# Patient Record
Sex: MALE | Race: WHITE | NOT HISPANIC OR LATINO | Employment: FULL TIME | URBAN - METROPOLITAN AREA
[De-identification: names, ages, dates, MRNs, and addresses within clinical notes are randomized per-mention and may not be internally consistent; named-entity substitution may affect disease eponyms.]

---

## 2023-04-24 LAB
ANION GAP IN SER/PLAS: 17 MMOL/L (ref 10–20)
CALCIUM (MG/DL) IN SER/PLAS: 10.3 MG/DL (ref 8.6–10.3)
CARBON DIOXIDE, TOTAL (MMOL/L) IN SER/PLAS: 25 MMOL/L (ref 21–32)
CHLORIDE (MMOL/L) IN SER/PLAS: 102 MMOL/L (ref 98–107)
CREATININE (MG/DL) IN SER/PLAS: 1.09 MG/DL (ref 0.5–1.3)
ESTIMATED AVERAGE GLUCOSE FOR HBA1C: 137 MG/DL
GFR MALE: 75 ML/MIN/1.73M2
GLUCOSE (MG/DL) IN SER/PLAS: 68 MG/DL (ref 74–99)
HEMOGLOBIN A1C/HEMOGLOBIN TOTAL IN BLOOD: 6.4 %
POTASSIUM (MMOL/L) IN SER/PLAS: 4 MMOL/L (ref 3.5–5.3)
PROSTATE SPECIFIC AG (NG/ML) IN SER/PLAS: 1.11 NG/ML (ref 0–4)
SODIUM (MMOL/L) IN SER/PLAS: 140 MMOL/L (ref 136–145)
UREA NITROGEN (MG/DL) IN SER/PLAS: 21 MG/DL (ref 6–23)

## 2023-05-02 ENCOUNTER — HOSPITAL ENCOUNTER (OUTPATIENT)
Dept: DATA CONVERSION | Facility: HOSPITAL | Age: 63
End: 2023-05-02
Attending: INTERNAL MEDICINE | Admitting: INTERNAL MEDICINE
Payer: COMMERCIAL

## 2023-05-02 DIAGNOSIS — M53.1 CERVICOBRACHIAL SYNDROME: ICD-10-CM

## 2023-05-02 DIAGNOSIS — Z95.1 PRESENCE OF AORTOCORONARY BYPASS GRAFT: ICD-10-CM

## 2023-05-02 DIAGNOSIS — Z12.11 ENCOUNTER FOR SCREENING FOR MALIGNANT NEOPLASM OF COLON: ICD-10-CM

## 2023-05-02 DIAGNOSIS — G47.33 OBSTRUCTIVE SLEEP APNEA (ADULT) (PEDIATRIC): ICD-10-CM

## 2023-05-02 DIAGNOSIS — I49.01 VENTRICULAR FIBRILLATION (MULTI): ICD-10-CM

## 2023-05-02 DIAGNOSIS — E78.5 HYPERLIPIDEMIA, UNSPECIFIED: ICD-10-CM

## 2023-05-02 DIAGNOSIS — Z95.5 PRESENCE OF CORONARY ANGIOPLASTY IMPLANT AND GRAFT: ICD-10-CM

## 2023-05-02 DIAGNOSIS — K57.30 DIVERTICULOSIS OF LARGE INTESTINE WITHOUT PERFORATION OR ABSCESS WITHOUT BLEEDING: ICD-10-CM

## 2023-05-02 DIAGNOSIS — I25.2 OLD MYOCARDIAL INFARCTION: ICD-10-CM

## 2023-05-02 DIAGNOSIS — G56.03 CARPAL TUNNEL SYNDROME, BILATERAL UPPER LIMBS: ICD-10-CM

## 2023-05-02 DIAGNOSIS — D12.2 BENIGN NEOPLASM OF ASCENDING COLON: ICD-10-CM

## 2023-05-02 DIAGNOSIS — R73.03 PREDIABETES: ICD-10-CM

## 2023-05-02 DIAGNOSIS — Z87.891 PERSONAL HISTORY OF NICOTINE DEPENDENCE: ICD-10-CM

## 2023-05-02 DIAGNOSIS — I25.10 ATHEROSCLEROTIC HEART DISEASE OF NATIVE CORONARY ARTERY WITHOUT ANGINA PECTORIS: ICD-10-CM

## 2023-05-02 DIAGNOSIS — Z79.84 LONG TERM (CURRENT) USE OF ORAL HYPOGLYCEMIC DRUGS: ICD-10-CM

## 2023-05-02 DIAGNOSIS — Z86.010 PERSONAL HISTORY OF COLONIC POLYPS: ICD-10-CM

## 2023-05-02 DIAGNOSIS — I10 ESSENTIAL (PRIMARY) HYPERTENSION: ICD-10-CM

## 2023-05-02 DIAGNOSIS — Z79.82 LONG TERM (CURRENT) USE OF ASPIRIN: ICD-10-CM

## 2023-05-02 LAB — POCT GLUCOSE: 109 MG/DL (ref 74–99)

## 2023-05-08 LAB
COMPLETE PATHOLOGY REPORT: NORMAL
CONVERTED CLINICAL DIAGNOSIS-HISTORY: NORMAL
CONVERTED FINAL DIAGNOSIS: NORMAL
CONVERTED FINAL REPORT PDF LINK TO COPY AND PASTE: NORMAL
CONVERTED GROSS DESCRIPTION: NORMAL

## 2023-05-12 ENCOUNTER — OFFICE VISIT (OUTPATIENT)
Dept: PRIMARY CARE | Facility: CLINIC | Age: 63
End: 2023-05-12
Payer: COMMERCIAL

## 2023-05-12 VITALS
HEART RATE: 60 BPM | RESPIRATION RATE: 16 BRPM | SYSTOLIC BLOOD PRESSURE: 127 MMHG | WEIGHT: 233.6 LBS | TEMPERATURE: 97.3 F | DIASTOLIC BLOOD PRESSURE: 73 MMHG | BODY MASS INDEX: 33.52 KG/M2 | OXYGEN SATURATION: 98 %

## 2023-05-12 DIAGNOSIS — G47.33 OBSTRUCTIVE SLEEP APNEA ON CPAP: Primary | ICD-10-CM

## 2023-05-12 DIAGNOSIS — R93.1 AGATSTON CORONARY ARTERY CALCIUM SCORE GREATER THAN 400: ICD-10-CM

## 2023-05-12 DIAGNOSIS — E78.5 DYSLIPIDEMIA, GOAL LDL BELOW 70: ICD-10-CM

## 2023-05-12 DIAGNOSIS — K76.0 FATTY LIVER DISEASE, NONALCOHOLIC: ICD-10-CM

## 2023-05-12 DIAGNOSIS — I25.10 CORONARY ARTERY DISEASE INVOLVING NATIVE CORONARY ARTERY OF NATIVE HEART WITHOUT ANGINA PECTORIS: ICD-10-CM

## 2023-05-12 DIAGNOSIS — E66.9 CLASS 1 OBESITY WITH SERIOUS COMORBIDITY AND BODY MASS INDEX (BMI) OF 33.0 TO 33.9 IN ADULT, UNSPECIFIED OBESITY TYPE: ICD-10-CM

## 2023-05-12 DIAGNOSIS — E11.9 TYPE 2 DIABETES MELLITUS WITHOUT COMPLICATION, WITHOUT LONG-TERM CURRENT USE OF INSULIN (MULTI): ICD-10-CM

## 2023-05-12 DIAGNOSIS — I10 ESSENTIAL HYPERTENSION WITH GOAL BLOOD PRESSURE LESS THAN 130/85: ICD-10-CM

## 2023-05-12 DIAGNOSIS — I46.9 CARDIAC ARREST WITH VENTRICULAR FIBRILLATION (MULTI): ICD-10-CM

## 2023-05-12 DIAGNOSIS — D12.6 TUBULAR ADENOMA OF COLON: ICD-10-CM

## 2023-05-12 DIAGNOSIS — I49.01 CARDIAC ARREST WITH VENTRICULAR FIBRILLATION (MULTI): ICD-10-CM

## 2023-05-12 PROBLEM — R73.03 PREDIABETES: Status: ACTIVE | Noted: 2023-05-12

## 2023-05-12 PROBLEM — E66.01 SEVERE OBESITY WITH BODY MASS INDEX (BMI) OF 35.0 TO 39.9 WITH SERIOUS COMORBIDITY (MULTI): Status: RESOLVED | Noted: 2023-05-12 | Resolved: 2023-05-12

## 2023-05-12 PROBLEM — Z95.1 S/P CABG (CORONARY ARTERY BYPASS GRAFT): Status: ACTIVE | Noted: 2023-05-12

## 2023-05-12 PROBLEM — K42.9 UMBILICAL HERNIA: Status: ACTIVE | Noted: 2023-05-12

## 2023-05-12 PROBLEM — Z97.3 WEARS GLASSES: Status: ACTIVE | Noted: 2023-05-12

## 2023-05-12 PROBLEM — R91.8 LUNG NODULES: Status: ACTIVE | Noted: 2023-05-12

## 2023-05-12 PROBLEM — K21.9 CHRONIC GERD: Status: ACTIVE | Noted: 2023-05-12

## 2023-05-12 PROBLEM — E55.9 VITAMIN D DEFICIENCY: Status: ACTIVE | Noted: 2023-05-12

## 2023-05-12 PROBLEM — S39.012A LUMBAR STRAIN: Status: ACTIVE | Noted: 2023-05-12

## 2023-05-12 PROBLEM — K40.20 BILATERAL INGUINAL HERNIA: Status: ACTIVE | Noted: 2023-05-12

## 2023-05-12 PROBLEM — I22.2 SUBSEQUENT NON-ST ELEVATION (NSTEMI) MYOCARDIAL INFARCTION (MULTI): Status: ACTIVE | Noted: 2023-05-12

## 2023-05-12 PROBLEM — M77.12 LATERAL EPICONDYLITIS OF LEFT ELBOW: Status: ACTIVE | Noted: 2023-05-12

## 2023-05-12 PROBLEM — E66.01 SEVERE OBESITY WITH BODY MASS INDEX (BMI) OF 35.0 TO 39.9 WITH SERIOUS COMORBIDITY (MULTI): Status: ACTIVE | Noted: 2023-05-12

## 2023-05-12 PROBLEM — L91.8 SKIN TAG: Status: ACTIVE | Noted: 2023-05-12

## 2023-05-12 PROBLEM — L63.9 ALOPECIA AREATA: Status: ACTIVE | Noted: 2023-05-12

## 2023-05-12 PROBLEM — Z97.2 WEARS DENTURES: Status: ACTIVE | Noted: 2023-05-12

## 2023-05-12 PROBLEM — M54.12 CERVICAL RADICULOPATHY: Status: ACTIVE | Noted: 2023-05-12

## 2023-05-12 PROBLEM — G56.03 BILATERAL CARPAL TUNNEL SYNDROME: Status: ACTIVE | Noted: 2023-05-12

## 2023-05-12 PROBLEM — M17.11 RIGHT KNEE DJD: Status: ACTIVE | Noted: 2023-05-12

## 2023-05-12 PROBLEM — M25.361 OTHER INSTABILITY, RIGHT KNEE: Status: ACTIVE | Noted: 2023-05-12

## 2023-05-12 PROBLEM — M17.9 DJD (DEGENERATIVE JOINT DISEASE) OF KNEE: Status: ACTIVE | Noted: 2023-05-12

## 2023-05-12 PROBLEM — J32.9 SINUSITIS: Status: ACTIVE | Noted: 2023-05-12

## 2023-05-12 PROBLEM — R20.2 PARESTHESIA OF BOTH HANDS: Status: ACTIVE | Noted: 2023-05-12

## 2023-05-12 PROBLEM — N40.0 BENIGN ENLARGEMENT OF PROSTATE: Status: ACTIVE | Noted: 2023-05-12

## 2023-05-12 PROCEDURE — 3078F DIAST BP <80 MM HG: CPT | Performed by: INTERNAL MEDICINE

## 2023-05-12 PROCEDURE — 4010F ACE/ARB THERAPY RXD/TAKEN: CPT | Performed by: INTERNAL MEDICINE

## 2023-05-12 PROCEDURE — 3044F HG A1C LEVEL LT 7.0%: CPT | Performed by: INTERNAL MEDICINE

## 2023-05-12 PROCEDURE — 1036F TOBACCO NON-USER: CPT | Performed by: INTERNAL MEDICINE

## 2023-05-12 PROCEDURE — 3074F SYST BP LT 130 MM HG: CPT | Performed by: INTERNAL MEDICINE

## 2023-05-12 PROCEDURE — 99214 OFFICE O/P EST MOD 30 MIN: CPT | Performed by: INTERNAL MEDICINE

## 2023-05-12 PROCEDURE — 3008F BODY MASS INDEX DOCD: CPT | Performed by: INTERNAL MEDICINE

## 2023-05-12 RX ORDER — MELOXICAM 7.5 MG/1
1 TABLET ORAL DAILY
COMMUNITY
Start: 2020-09-05 | End: 2024-02-29 | Stop reason: ALTCHOICE

## 2023-05-12 RX ORDER — ATORVASTATIN CALCIUM 80 MG/1
10 TABLET, FILM COATED ORAL DAILY
COMMUNITY
End: 2023-10-16 | Stop reason: SDUPTHER

## 2023-05-12 RX ORDER — GLIMEPIRIDE 2 MG/1
TABLET ORAL
COMMUNITY
End: 2023-05-17 | Stop reason: SDUPTHER

## 2023-05-12 RX ORDER — SOD SULF/POT CHLORIDE/MAG SULF 1.479 G
TABLET ORAL
COMMUNITY
Start: 2023-03-06 | End: 2024-02-29 | Stop reason: ALTCHOICE

## 2023-05-12 RX ORDER — METOPROLOL TARTRATE 25 MG/1
12.5 TABLET, FILM COATED ORAL 2 TIMES DAILY
COMMUNITY
End: 2024-03-08

## 2023-05-12 RX ORDER — NITROGLYCERIN 400 UG/1
SPRAY ORAL
COMMUNITY
Start: 2019-09-18 | End: 2023-05-12 | Stop reason: SDUPTHER

## 2023-05-12 RX ORDER — CALCIUM CITRATE/VITAMIN D3 200MG-6.25
TABLET ORAL
COMMUNITY
Start: 2017-08-24 | End: 2023-10-16 | Stop reason: SDUPTHER

## 2023-05-12 RX ORDER — NITROGLYCERIN 400 UG/1
2 SPRAY ORAL EVERY 5 MIN PRN
Qty: 12 G | Refills: 11 | Status: SHIPPED | OUTPATIENT
Start: 2023-05-12 | End: 2023-10-09 | Stop reason: HOSPADM

## 2023-05-12 RX ORDER — AMOXICILLIN AND CLAVULANATE POTASSIUM 875; 125 MG/1; MG/1
TABLET, FILM COATED ORAL
COMMUNITY
Start: 2022-08-22 | End: 2023-05-12 | Stop reason: ALTCHOICE

## 2023-05-12 RX ORDER — ACETAMINOPHEN 500 MG/1
CAPSULE, LIQUID FILLED ORAL
COMMUNITY

## 2023-05-12 RX ORDER — IBUPROFEN 200 MG
TABLET ORAL
COMMUNITY

## 2023-05-12 RX ORDER — LISINOPRIL 40 MG/1
40 TABLET ORAL DAILY
COMMUNITY
End: 2023-10-16 | Stop reason: SDUPTHER

## 2023-05-12 RX ORDER — FLUTICASONE PROPIONATE 50 MCG
SPRAY, SUSPENSION (ML) NASAL
COMMUNITY
Start: 2022-08-22 | End: 2024-02-29 | Stop reason: ALTCHOICE

## 2023-05-12 RX ORDER — ACETAMINOPHEN 500 MG
TABLET ORAL
COMMUNITY
Start: 2020-11-19

## 2023-05-12 RX ORDER — METFORMIN HYDROCHLORIDE 1000 MG/1
1000 TABLET ORAL
COMMUNITY
End: 2023-10-16 | Stop reason: SDUPTHER

## 2023-05-12 RX ORDER — METFORMIN HYDROCHLORIDE 500 MG/1
1000 TABLET ORAL 2 TIMES DAILY
COMMUNITY
End: 2023-05-12 | Stop reason: ALTCHOICE

## 2023-05-12 ASSESSMENT — ENCOUNTER SYMPTOMS
LOSS OF SENSATION IN FEET: 0
DEPRESSION: 0
OCCASIONAL FEELINGS OF UNSTEADINESS: 0

## 2023-05-12 ASSESSMENT — PATIENT HEALTH QUESTIONNAIRE - PHQ9
SUM OF ALL RESPONSES TO PHQ9 QUESTIONS 1 AND 2: 0
2. FEELING DOWN, DEPRESSED OR HOPELESS: NOT AT ALL
1. LITTLE INTEREST OR PLEASURE IN DOING THINGS: NOT AT ALL

## 2023-05-17 DIAGNOSIS — R73.03 PREDIABETES: Primary | ICD-10-CM

## 2023-05-17 RX ORDER — GLIMEPIRIDE 2 MG/1
TABLET ORAL
Qty: 90 TABLET | Refills: 3 | Status: SHIPPED | OUTPATIENT
Start: 2023-05-17 | End: 2024-04-13

## 2023-05-25 ENCOUNTER — TELEMEDICINE (OUTPATIENT)
Dept: PHARMACY | Facility: HOSPITAL | Age: 63
End: 2023-05-25
Payer: COMMERCIAL

## 2023-05-25 DIAGNOSIS — E11.9 TYPE 2 DIABETES MELLITUS WITHOUT COMPLICATION, WITHOUT LONG-TERM CURRENT USE OF INSULIN (MULTI): ICD-10-CM

## 2023-06-01 ENCOUNTER — TELEMEDICINE (OUTPATIENT)
Dept: PHARMACY | Facility: HOSPITAL | Age: 63
End: 2023-06-01
Payer: COMMERCIAL

## 2023-06-01 DIAGNOSIS — E11.9 TYPE 2 DIABETES MELLITUS WITHOUT COMPLICATION, WITHOUT LONG-TERM CURRENT USE OF INSULIN (MULTI): Primary | ICD-10-CM

## 2023-06-01 NOTE — PROGRESS NOTES
Subjective   Patient ID: Sal Kruger is a 62 y.o. male who presents for medication management for Prediabetes    Referring Provider: Lake Sage MD     Patient referred for medication assistance/coverage for once-weekly GLP1RA therapy.     Objective     There were no vitals taken for this visit.     Diabetes Pharmacotherapy:   - Metformin 1000 mg: Take one tablet by mouth twice daily  - Glimepiride 2 mg: Take one tablet by mouth once daily    Secondary Prevention:   - Aspirin 81 mg: Yes  - Statin: Yes (Atorvastatin 80 mg)  - ACE-I/ARB: Yes (Lisinopril 40 mg)    LAB  Lab Results   Component Value Date    BILITOT 0.7 12/08/2019    CALCIUM 10.3 04/24/2023    CO2 25 04/24/2023     04/24/2023    CREATININE 1.09 04/24/2023    GLUCOSE 68 (L) 04/24/2023    ALKPHOS 51 12/08/2019    K 4.0 04/24/2023    PROT 6.6 12/08/2019     04/24/2023    AST 20 12/08/2019    ALT 38 10/08/2022    BUN 21 04/24/2023    ANIONGAP 17 04/24/2023    MG 2.20 12/09/2019    PHOS 4.3 12/09/2019    ALBUMIN 3.6 12/09/2019    GFRMALE 75 04/24/2023     Lab Results   Component Value Date    TRIG 193 (H) 10/08/2022    CHOL 112 10/08/2022    HDL 38.0 (A) 10/08/2022     Component      Latest Ref Rng 10/8/2022   LDL      0 - 99 mg/dL 35      Lab Results   Component Value Date    HGBA1C 6.4 (A) 04/24/2023    HGBA1C 5.9 (A) 10/08/2022    HGBA1C 6.0 (A) 05/11/2022     Lab Results   Component Value Date    CREATININE 1.09 04/24/2023        The ASCVD Risk score (Marci ANGEL, et al., 2019) failed to calculate for the following reasons:    The patient has a prior MI or stroke diagnosis    Assessment/Plan   Problem List Items Addressed This Visit          Endocrine/Metabolic    Type 2 diabetes mellitus without complication, without long-term current use of insulin (CMS/[facility]) - Primary     Prior authorization has been submitted on behalf of this patient for Trulicity 0.75 mg on 06/01/23 . Awaiting determination for status of PA request.    Patient  will be contacted upon response of patient's prior authorization.     Advised patient to continue with Diabetes pharmacotherapy as prescribed at this time. I will follow up when the approval/denial has been provided via [employer].     Tres Adair, PharmD  Clinical Pharmacist  [phone]    Continue all meds under the continuation of care with the referring provider and clinical pharmacy team

## 2023-06-12 ENCOUNTER — TELEPHONE (OUTPATIENT)
Dept: PRIMARY CARE | Facility: CLINIC | Age: 63
End: 2023-06-12
Payer: COMMERCIAL

## 2023-06-12 NOTE — TELEPHONE ENCOUNTER
Sal Kruger has been denied for prior authorization for Trulicity 0.75 mg . Patient has been contacted regarding the status of their prior authorization.     Reason for Denial: Current hemoglobin A1c is <7.0% as of 4/24/2023 and patient is not on/has not tried a brand name oral medication (I.e. Januvia, Tradjenta, Farxiga, Jardiance).     Alternative therapy for Januvia 100 mg instead of Glimepiride 2 mg could be considered to reduce potential risk of hypoglycemia and reduce associated weight gain with sulfonylurea therapy. Based on BMP from 10/8/2022 current renal function would be appropriate for Januvia 100 mg.     Tres Adair, PharmD  Clinical Pharmacist  [phone]

## 2023-09-07 VITALS — BODY MASS INDEX: 32.98 KG/M2 | WEIGHT: 230.38 LBS | HEIGHT: 70 IN

## 2023-09-21 ENCOUNTER — LAB (OUTPATIENT)
Dept: LAB | Facility: LAB | Age: 63
End: 2023-09-21
Payer: COMMERCIAL

## 2023-09-21 DIAGNOSIS — E11.9 TYPE 2 DIABETES MELLITUS WITHOUT COMPLICATION, WITHOUT LONG-TERM CURRENT USE OF INSULIN (MULTI): ICD-10-CM

## 2023-09-21 DIAGNOSIS — K76.0 FATTY LIVER DISEASE, NONALCOHOLIC: ICD-10-CM

## 2023-09-21 DIAGNOSIS — E78.5 DYSLIPIDEMIA, GOAL LDL BELOW 70: ICD-10-CM

## 2023-09-21 LAB
ALANINE AMINOTRANSFERASE (SGPT) (U/L) IN SER/PLAS: 34 U/L (ref 10–52)
ALBUMIN (G/DL) IN SER/PLAS: 4.3 G/DL (ref 3.4–5)
ALKALINE PHOSPHATASE (U/L) IN SER/PLAS: 51 U/L (ref 33–136)
ANION GAP IN SER/PLAS: 13 MMOL/L (ref 10–20)
ASPARTATE AMINOTRANSFERASE (SGOT) (U/L) IN SER/PLAS: 29 U/L (ref 9–39)
BILIRUBIN DIRECT (MG/DL) IN SER/PLAS: 0.1 MG/DL (ref 0–0.3)
BILIRUBIN TOTAL (MG/DL) IN SER/PLAS: 0.5 MG/DL (ref 0–1.2)
CALCIUM (MG/DL) IN SER/PLAS: 9.6 MG/DL (ref 8.6–10.3)
CARBON DIOXIDE, TOTAL (MMOL/L) IN SER/PLAS: 28 MMOL/L (ref 21–32)
CHLORIDE (MMOL/L) IN SER/PLAS: 106 MMOL/L (ref 98–107)
CHOLESTEROL (MG/DL) IN SER/PLAS: 112 MG/DL (ref 0–199)
CHOLESTEROL IN HDL (MG/DL) IN SER/PLAS: 38.3 MG/DL
CHOLESTEROL/HDL RATIO: 2.9
CREATININE (MG/DL) IN SER/PLAS: 0.96 MG/DL (ref 0.5–1.3)
ESTIMATED AVERAGE GLUCOSE FOR HBA1C: 134 MG/DL
GFR MALE: 88 ML/MIN/1.73M2
GLUCOSE (MG/DL) IN SER/PLAS: 83 MG/DL (ref 74–99)
HEMOGLOBIN A1C/HEMOGLOBIN TOTAL IN BLOOD: 6.3 %
LDL: 38 MG/DL (ref 0–99)
POTASSIUM (MMOL/L) IN SER/PLAS: 4.4 MMOL/L (ref 3.5–5.3)
PROTEIN TOTAL: 7.2 G/DL (ref 6.4–8.2)
SODIUM (MMOL/L) IN SER/PLAS: 143 MMOL/L (ref 136–145)
TRIGLYCERIDE (MG/DL) IN SER/PLAS: 179 MG/DL (ref 0–149)
UREA NITROGEN (MG/DL) IN SER/PLAS: 19 MG/DL (ref 6–23)
VLDL: 36 MG/DL (ref 0–40)

## 2023-09-21 PROCEDURE — 80061 LIPID PANEL: CPT

## 2023-09-21 PROCEDURE — 36415 COLL VENOUS BLD VENIPUNCTURE: CPT

## 2023-09-21 PROCEDURE — 80076 HEPATIC FUNCTION PANEL: CPT

## 2023-09-21 PROCEDURE — 83036 HEMOGLOBIN GLYCOSYLATED A1C: CPT

## 2023-09-21 PROCEDURE — 80048 BASIC METABOLIC PNL TOTAL CA: CPT

## 2023-10-04 ENCOUNTER — HOSPITAL ENCOUNTER (OUTPATIENT)
Dept: RADIOLOGY | Facility: CLINIC | Age: 63
End: 2023-10-04
Payer: COMMERCIAL

## 2023-10-05 ENCOUNTER — OFFICE VISIT (OUTPATIENT)
Dept: ORTHOPEDIC SURGERY | Facility: CLINIC | Age: 63
End: 2023-10-05
Payer: COMMERCIAL

## 2023-10-05 DIAGNOSIS — M17.11 PRIMARY OSTEOARTHRITIS OF RIGHT KNEE: Primary | ICD-10-CM

## 2023-10-05 PROCEDURE — 1126F AMNT PAIN NOTED NONE PRSNT: CPT | Performed by: FAMILY MEDICINE

## 2023-10-05 PROCEDURE — 1159F MED LIST DOCD IN RCRD: CPT | Performed by: FAMILY MEDICINE

## 2023-10-05 PROCEDURE — 4010F ACE/ARB THERAPY RXD/TAKEN: CPT | Performed by: FAMILY MEDICINE

## 2023-10-05 PROCEDURE — 1036F TOBACCO NON-USER: CPT | Performed by: FAMILY MEDICINE

## 2023-10-05 PROCEDURE — 3008F BODY MASS INDEX DOCD: CPT | Performed by: FAMILY MEDICINE

## 2023-10-05 PROCEDURE — 20611 DRAIN/INJ JOINT/BURSA W/US: CPT | Performed by: FAMILY MEDICINE

## 2023-10-05 PROCEDURE — 99214 OFFICE O/P EST MOD 30 MIN: CPT | Performed by: FAMILY MEDICINE

## 2023-10-05 PROCEDURE — 1160F RVW MEDS BY RX/DR IN RCRD: CPT | Performed by: FAMILY MEDICINE

## 2023-10-05 PROCEDURE — 3044F HG A1C LEVEL LT 7.0%: CPT | Performed by: FAMILY MEDICINE

## 2023-10-05 NOTE — LETTER
October 9, 2023     Lake Sage MD  [address]  [facility]  [facility] [address]    Patient: Sal Kruger   YOB: 1960   Date of Visit: 10/5/2023       Dear Dr. Lake Sage MD:    Thank you for referring Sal Kruger to me for evaluation. Below are my notes for this consultation.  If you have questions, please do not hesitate to call me. I look forward to following your patient along with you.       Sincerely,     Jaime Rice MD      CC: No Recipients  ______________________________________________________________________________________    Sports Medicine Office Note    Today's Date: 10/9/2023     HPI: Sal Kruger is a 63 y.o.  who presents today for follow-up of chronic right knee pain with DJD.    He has received multiple, serial cortisone injections for his chronic knee DJD. It gives him great relief for up 12 months the last several times. His most recent injection on 02/2020 gave him great relief for over 1 year. His pain has gradually returned over the past 2-3 weeks. He denies injury or trauma. Today, he has dull deep right knee pain and denies injury or trauma. He takes occasional ibuprofen which has been less effective recently. He denies other treatments. He continues to work with his brace. He has no new complaints. He is requesting repeat cortisone injection for analgesia. We agreed upon repeating the cortisone injection into his right knee which he tolerated well. He will take prescription acetaminophen. He can continue working in the knee brace. Activity modifications were reviewed. He understands that he will ultimately need knee replacement but we will try to hold this off as long as possible.      Of note, he had an MI in May 2021 and a second one in June 2021 that required cardiac resuscitation. This led to 6 stents that eventually clotted off and he had triple bypass in December at [facility]. He has recovered very well.     On  1/12/22 Sal returns today requesting repeat CSI for pain control at his right knee. He has done very well with serial corticosteroid injections for his chronic right knee DJD. The last CSI made him feel 80% better over all. Over the past 2-3 weeks his pain began to return, worse with walking, stairs, bending and does wake him at night on occasion. No new injury.   We agreed upon repeating the cortisone injection into his right knee which he tolerated well. He will take prescription acetaminophen. He can continue with his knee brace. Activity modifications were reviewed. He understands that he will ultimately need knee replacement but we will try to hold this off as long as possible. He will followup in 3-6 months or sooner for recheck.     On 9/8/2022, he returns for an 8-month follow-up of chronic right knee pain with DJD. He is requesting repeat cortisone injection for long-term analgesia to help with pain and swelling secondary to knee arthritis. He reports a previous injection given on 1/12/2022 gave him 6 to 7 months of great relief. It started to wear off and his pain has increased over the past 1 month. He denies interval injury or trauma. We agreed upon repeating the cortisone injection into his right knee which he tolerated well. He will take prescription acetaminophen. He can continue with his knee brace. Activity modifications were reviewed. He understands that he will ultimately need knee replacement but we will try to hold this off as long as possible. He will followup in 3-6 months or sooner for recheck.     On 9/26/2022, he returns for 2-week follow-up of chronic right knee pain with DJD. He has received good benefit from the cortisone injection at his last visit. He is wanting to replace a orthopedic knee brace he has been wearing for several years. Upon reviewing the chart it appears to be from 2015 when I gave it to him. It looks like an [employer] hinge brace. It is falling apart and he no longer  is getting good fit. He would like a new one. He denies interval injury or trauma since his last visit.   We agreed upon replacing his previous brace with a custom made DJO climaflex OA lateral  which should give him better relief than his current off-the-shelf hinged brace. I will see him back when his pain returns and his cortisone shot wears off.     On 3/16/2023, he returns for 6-month follow-up of chronic right knee DJD. He reports previous injection given on 9/8/2022 lasted 5 months and then started to wear off. He is very happy with his  knee brace and wears it often. He denies interval injury or trauma. He is requesting long-term analgesia.   We agreed upon repeating the cortisone injection into his right knee which he tolerated well. He will take prescription acetaminophen. He can continue with his knee brace. Activity modifications were reviewed. He understands that he will ultimately need knee replacement but we will try to hold this off as long as possible. He will followup in 3-6 months or sooner for recheck.     Today, he returns for 7-month follow-up of chronic right knee pain with DJD.  He is requesting repeat cortisone injection for long-term analgesia.  The previous injection given on 3/16/2023 recently started to wear off over the past couple of weeks.  He denies interval injury or trauma.    He has no other complaints.     Physical Examination:     The right knee is without obvious signs of acute bony deformity, swelling, erythema, or ecchymosis. There is trace to grade 1 effusion. The patella is without tenderness. Apprehension is negative with medial and lateral glide. Patella crepitus and patella grind are positive. The medial joint line is mildly tender and without bony crepitus or step-off. The lateral joint line is mildly tender and without bony crepitus or step-off. Flexion & extension are full and symmetrical. There is no instability with stress testing. The opposite knee  is nontender and stable. Gait is pain-free and tandem.       Procedure Note:  After consent was obtained, the right knee was prepped in a sterile fashion. Ultrasound guidance was used to help insure proper needle placement into the joint, decrease patient discomfort, and decrease collateral damage. The knee joint was visualized and Depo-Medrol 80 mg with lidocaine 4 mL & ropivacaine 4 mL were injected without any complications. Ultrasound images were saved on an internal file for later reference. The patient tolerated the procedure well and the area was cleaned and bandaged.      Assessment and Plan:     We reviewed the exam and x-ray findings and discussed the conservative and surgical treatment options. We agreed We agreed upon repeating the cortisone injection into his right knee which he tolerated well. He will take prescription acetaminophen. He can continue with his knee brace. Activity modifications were reviewed. He understands that he will ultimately need knee replacement but we will try to hold this off as long as possible. He will followup in 3-6 months or sooner for recheck.     **This note was dictated using Dragon speech recognition software and was not corrected for spelling or grammatical errors**.    Jaime Rice MD  Sports Medicine Specialist  [facility]    Right knee injection    Patient ID: Sal Kruger is a 63 y.o. male.    L Inj/Asp on 10/5/2023 5:17 PM  Indications: pain  Details: 22 G needle, ultrasound-guided superolateral approach

## 2023-10-05 NOTE — PROGRESS NOTES
Right knee injection    Patient ID: Sal Kruger is a 63 y.o. male.    L Inj/Asp on 10/5/2023 5:17 PM  Indications: pain  Details: 22 G needle, ultrasound-guided superolateral approach

## 2023-10-09 ENCOUNTER — HOSPITAL ENCOUNTER (OUTPATIENT)
Dept: GASTROENTEROLOGY | Facility: EXTERNAL LOCATION | Age: 63
Discharge: HOME | End: 2023-10-09
Payer: COMMERCIAL

## 2023-10-09 ENCOUNTER — ANESTHESIA (OUTPATIENT)
Dept: GASTROENTEROLOGY | Facility: EXTERNAL LOCATION | Age: 63
End: 2023-10-09

## 2023-10-09 ENCOUNTER — ANESTHESIA EVENT (OUTPATIENT)
Dept: GASTROENTEROLOGY | Facility: EXTERNAL LOCATION | Age: 63
End: 2023-10-09

## 2023-10-09 VITALS
OXYGEN SATURATION: 99 % | RESPIRATION RATE: 14 BRPM | SYSTOLIC BLOOD PRESSURE: 136 MMHG | HEART RATE: 71 BPM | DIASTOLIC BLOOD PRESSURE: 80 MMHG | BODY MASS INDEX: 32.93 KG/M2 | HEIGHT: 70 IN | WEIGHT: 230 LBS | TEMPERATURE: 97.9 F

## 2023-10-09 DIAGNOSIS — Z12.11 ENCOUNTER FOR SCREENING FOR MALIGNANT NEOPLASM OF COLON: Primary | ICD-10-CM

## 2023-10-09 PROCEDURE — 45385 COLONOSCOPY W/LESION REMOVAL: CPT | Performed by: INTERNAL MEDICINE

## 2023-10-09 PROCEDURE — 45390 COLONOSCOPY W/RESECTION: CPT | Performed by: INTERNAL MEDICINE

## 2023-10-09 PROCEDURE — 45380 COLONOSCOPY AND BIOPSY: CPT | Performed by: INTERNAL MEDICINE

## 2023-10-09 PROCEDURE — 88305 TISSUE EXAM BY PATHOLOGIST: CPT | Performed by: STUDENT IN AN ORGANIZED HEALTH CARE EDUCATION/TRAINING PROGRAM

## 2023-10-09 PROCEDURE — 88305 TISSUE EXAM BY PATHOLOGIST: CPT | Mod: TC,59,ELYLAB

## 2023-10-09 PROCEDURE — 88305 TISSUE EXAM BY PATHOLOGIST: CPT | Mod: TC,SUR,CMCLAB,ELYLAB

## 2023-10-09 RX ORDER — SODIUM CHLORIDE 9 MG/ML
20 INJECTION, SOLUTION INTRAVENOUS CONTINUOUS
Status: CANCELLED | OUTPATIENT
Start: 2023-10-09

## 2023-10-09 RX ORDER — PROPOFOL 10 MG/ML
INJECTION, EMULSION INTRAVENOUS AS NEEDED
Status: DISCONTINUED | OUTPATIENT
Start: 2023-10-09 | End: 2023-10-09

## 2023-10-09 RX ORDER — EPINEPHRINE 0.1 MG/ML
INJECTION INTRACARDIAC; INTRAVENOUS AS NEEDED
Status: COMPLETED | OUTPATIENT
Start: 2023-10-09 | End: 2023-10-09

## 2023-10-09 RX ORDER — LIDOCAINE HYDROCHLORIDE 20 MG/ML
INJECTION, SOLUTION INFILTRATION; PERINEURAL AS NEEDED
Status: DISCONTINUED | OUTPATIENT
Start: 2023-10-09 | End: 2023-10-09

## 2023-10-09 RX ADMIN — PROPOFOL 50 MG: 10 INJECTION, EMULSION INTRAVENOUS at 12:08

## 2023-10-09 RX ADMIN — LIDOCAINE HYDROCHLORIDE 100 MG: 20 INJECTION, SOLUTION INFILTRATION; PERINEURAL at 11:39

## 2023-10-09 RX ADMIN — PROPOFOL 50 MG: 10 INJECTION, EMULSION INTRAVENOUS at 11:59

## 2023-10-09 RX ADMIN — EPINEPHRINE 0.2 MG: 0.1 INJECTION INTRACARDIAC; INTRAVENOUS at 12:05

## 2023-10-09 RX ADMIN — PROPOFOL 100 MG: 10 INJECTION, EMULSION INTRAVENOUS at 11:41

## 2023-10-09 RX ADMIN — PROPOFOL 50 MG: 10 INJECTION, EMULSION INTRAVENOUS at 11:43

## 2023-10-09 RX ADMIN — PROPOFOL 50 MG: 10 INJECTION, EMULSION INTRAVENOUS at 11:50

## 2023-10-09 RX ADMIN — PROPOFOL 50 MG: 10 INJECTION, EMULSION INTRAVENOUS at 11:15

## 2023-10-09 SDOH — HEALTH STABILITY: MENTAL HEALTH: CURRENT SMOKER: 0

## 2023-10-09 ASSESSMENT — PAIN SCALES - GENERAL
PAINLEVEL_OUTOF10: 0 - NO PAIN
PAIN_LEVEL: 0
PAINLEVEL_OUTOF10: 0 - NO PAIN

## 2023-10-09 ASSESSMENT — COLUMBIA-SUICIDE SEVERITY RATING SCALE - C-SSRS
1. IN THE PAST MONTH, HAVE YOU WISHED YOU WERE DEAD OR WISHED YOU COULD GO TO SLEEP AND NOT WAKE UP?: NO
2. HAVE YOU ACTUALLY HAD ANY THOUGHTS OF KILLING YOURSELF?: NO
6. HAVE YOU EVER DONE ANYTHING, STARTED TO DO ANYTHING, OR PREPARED TO DO ANYTHING TO END YOUR LIFE?: NO

## 2023-10-09 ASSESSMENT — PAIN - FUNCTIONAL ASSESSMENT
PAIN_FUNCTIONAL_ASSESSMENT: 0-10

## 2023-10-09 NOTE — PRE-SEDATION DOCUMENTATION
Patient: Sal Kruger  MRN: [id number]    Pre-sedation Evaluation:  Sedation necessary for: Immobility and Analgesia  Requesting service: GI    History of Present Illness: Large polyp colon     Past Medical History:   Diagnosis Date    Body mass index (BMI) 33.0-33.9, adult 06/24/2020    BMI 33.0-33.9,adult    Body mass index (BMI) 35.0-35.9, adult 09/28/2021    BMI 35.0-35.9,adult    Body mass index (BMI) 35.0-35.9, adult 05/20/2018    BMI 35.0-35.9,adult    Body mass index (BMI) 36.0-36.9, adult 05/18/2021    BMI 36.0-36.9,adult    Body mass index (BMI) 36.0-36.9, adult 10/05/2018    BMI 36.0-36.9,adult    Body mass index (BMI) 37.0-37.9, adult 03/01/2019    BMI 37.0-37.9, adult    Cough, unspecified     Cough    Elevation of levels of liver transaminase levels     ALT (SGPT) level raised    Encounter for other preprocedural examination 11/19/2019    Pre-op evaluation    Fatty (change of) liver, not elsewhere classified     Fatty liver    Hypertension     Liver disease, unspecified 05/13/2017    Chronic liver disease    Other conditions influencing health status 10/14/2016    Cardiovascular Stress Test    Other specified diabetes mellitus with other oral complications (CMS/HCC) 05/31/2022    Diabetes mellitus of other type with oral complication, without long-term current use of insulin    Overweight 07/04/2015    Overweight    Pain in unspecified knee 04/30/2015    Knee pain    Personal history of colonic polyps     History of adenomatous polyp of colon    Personal history of other diseases of the nervous system and sense organs 01/15/2015    History of sleep apnea    Personal history of other endocrine, nutritional and metabolic disease 10/14/2016    History of diabetes mellitus    Personal history of other malignant neoplasm of skin 11/11/2022    History of basal cell cancer    Personal history of other specified conditions 11/06/2019    History of chest pain    Personal history of sudden cardiac arrest  08/02/2019    History of cardiac arrest    Prediabetes 01/15/2015    Prediabetes    Solitary pulmonary nodule 01/06/2018    Incidental pulmonary nodule, > 3mm and < 8mm    Strain of unspecified achilles tendon, initial encounter 07/04/2015    Achilles tendon sprain    Subsequent non-ST elevation (NSTEMI) myocardial infarction (CMS/[facility]) 06/21/2019    Subsequent non-ST elevation (NSTEMI) myocardial infarction    Unilateral primary osteoarthritis, unspecified knee 06/08/2015    Localized osteoarthritis of knee       Principle problems:  Patient Active Problem List    Diagnosis Date Noted    Type 2 diabetes mellitus without complication, without long-term current use of insulin (CMS/[facility]) 05/25/2023    Alopecia areata 05/12/2023    Benign enlargement of prostate 05/12/2023    Bilateral carpal tunnel syndrome 05/12/2023    Bilateral inguinal hernia 05/12/2023    Cardiac arrest with ventricular fibrillation (CMS/[facility]) 05/12/2023    Cervical radiculopathy 05/12/2023    Chronic GERD 05/12/2023    Agatston coronary artery calcium score greater than 400 05/12/2023    CAD (coronary artery disease), native coronary artery 05/12/2023    Coronary artery calcification seen on CAT scan 05/12/2023    DJD (degenerative joint disease) of knee 05/12/2023    Right knee DJD 05/12/2023    Essential hypertension 05/12/2023    Fatty liver 05/12/2023    Hyperlipidemia 05/12/2023    Lateral epicondylitis of left elbow 05/12/2023    Lumbar strain 05/12/2023    Lung nodules 05/12/2023    Obesity 05/12/2023    Obstructive sleep apnea on CPAP 05/12/2023    Other instability, right knee 05/12/2023    Paresthesia of both hands 05/12/2023    Prediabetes 05/12/2023    S/P CABG (coronary artery bypass graft) 05/12/2023    Sinusitis 05/12/2023    Skin tag 05/12/2023    Subsequent non-ST elevation (NSTEMI) myocardial infarction (CMS/[facility]) 05/12/2023    Tubular adenoma of colon 05/12/2023    Umbilical hernia 05/12/2023    Vitamin D deficiency 05/12/2023     Wears dentures 05/12/2023    Wears glasses 05/12/2023     Allergies:  No Known Allergies  PTA/Current Medications:  (Not in a hospital admission)    Current Outpatient Medications   Medication Sig Dispense Refill    aspirin 81 mg capsule Take 81 mg by mouth twice a day.      atorvastatin (Lipitor) 80 mg tablet Take 10 mg by mouth once daily. for high cholesterol      cholecalciferol (Vitamin D-3) 50 mcg (2,000 unit) capsule Take by mouth.      glimepiride (Amaryl) 2 mg tablet TAKE 1 (ONE) TABLET BY MOUTH EVERY DAY at dinner FOR DIABETES 90 tablet 3    ibuprofen 200 mg tablet Take by mouth.      lisinopril 40 mg tablet Take 1 tablet (40 mg) by mouth once daily. for blood pressure      meloxicam (Mobic) 7.5 mg tablet Take 1 tablet (7.5 mg) by mouth once daily.      metFORMIN (Glucophage) 1,000 mg tablet Take 1 tablet (1,000 mg) by mouth 2 times a day with meals.      metoprolol tartrate (Lopressor) 25 mg tablet Take 0.5 tablets (12.5 mg) by mouth 2 times a day.      acetaminophen 500 mg capsule Take by mouth.      fluticasone (Flonase) 50 mcg/actuation nasal spray Administer into affected nostril(s).      nitroglycerin (NitrolinguaL) 400 mcg/spray spray Place 2 sprays under the tongue every 5 minutes if needed for chest pain. (Patient not taking: Reported on 10/9/2023) 12 g 11    sod sulf-pot chloride-mag sulf (Sutab) 1.479-0.188- 0.225 gram tablet Take by mouth.      True Metrix Glucose Test Strip strip use to test glucose once daily as directedfor diabetes E13.68       No current facility-administered medications for this encounter.     Past Surgical History:   has a past surgical history that includes Ventral hernia repair (03/26/2014); Lithotripsy (01/15/2016); Other surgical history (01/15/2016); Skin cancer excision (01/18/2016); Other surgical history (12/11/2019); and US guided needle liver biopsy (8/10/2016).    Recent sedation/surgery (24 hours) No    Review of Systems:  Please check all that apply: No  significant medical history        NPO guidelines met: Yes    Physical Exam    Airway  Mallampati: III     Cardiovascular - normal exam  Rhythm: regular  Rate: normal     Dental    Pulmonary - normal exam         Plan    ASA 3     Deep   (H/O large polyp and inadequate prep. )

## 2023-10-09 NOTE — ANESTHESIA PREPROCEDURE EVALUATION
Patient: Sal Kruger    Procedure Information       Date/Time: 10/09/23 1050    Scheduled providers: Han Riggs MD    Procedure: COLONOSCOPY    Location: [facility]            Relevant Problems   Cardiovascular   (+) CAD (coronary artery disease), native coronary artery   (+) Cardiac arrest with ventricular fibrillation (CMS/HCC)   (+) Essential hypertension   (+) Hyperlipidemia   (+) Subsequent non-ST elevation (NSTEMI) myocardial infarction (CMS/HCC)      Endocrine   (+) Obesity   (+) Type 2 diabetes mellitus without complication, without long-term current use of insulin (CMS/HCC)      GI   (+) Chronic GERD      /Renal   (+) Fatty liver      Neuro/Psych   (+) Bilateral carpal tunnel syndrome   (+) Cervical radiculopathy      Pulmonary   (+) Lung nodules   (+) Obstructive sleep apnea on CPAP      GI/Hepatic   (+) Fatty liver      Musculoskeletal   (+) Bilateral carpal tunnel syndrome   (+) DJD (degenerative joint disease) of knee   (+) Right knee DJD      Other   (+) Lateral epicondylitis of left elbow       Clinical information reviewed:   Tobacco  Allergies  Meds   Med Hx  Surg Hx   Fam Hx  Soc Hx        NPO Detail:  NPO/Void Status  Date of Last Liquid: 10/09/23  Time of Last Liquid: 0700  Date of Last Solid: 10/06/23         Physical Exam    Airway  Mallampati: III  TM distance: >3 FB  Neck ROM: full     Cardiovascular - normal exam     Dental   (+) upper dentures, lower dentures     Pulmonary - normal exam     Abdominal          Anesthesia Plan    ASA 3     MAC     The patient is not a current smoker.    intravenous induction   Anesthetic plan and risks discussed with patient.    Plan discussed with CRNA.

## 2023-10-09 NOTE — PROGRESS NOTES
Sports Medicine Office Note    Today's Date: 10/9/2023     HPI: Sal Kruger is a 63 y.o.  who presents today for follow-up of chronic right knee pain with DJD.    He has received multiple, serial cortisone injections for his chronic knee DJD. It gives him great relief for up 12 months the last several times. His most recent injection on 02/2020 gave him great relief for over 1 year. His pain has gradually returned over the past 2-3 weeks. He denies injury or trauma. Today, he has dull deep right knee pain and denies injury or trauma. He takes occasional ibuprofen which has been less effective recently. He denies other treatments. He continues to work with his brace. He has no new complaints. He is requesting repeat cortisone injection for analgesia. We agreed upon repeating the cortisone injection into his right knee which he tolerated well. He will take prescription acetaminophen. He can continue working in the knee brace. Activity modifications were reviewed. He understands that he will ultimately need knee replacement but we will try to hold this off as long as possible.      Of note, he had an MI in May 2021 and a second one in June 2021 that required cardiac resuscitation. This led to 6 stents that eventually clotted off and he had triple bypass in December at [facility]. He has recovered very well.     On 1/12/22 Sal returns today requesting repeat CSI for pain control at his right knee. He has done very well with serial corticosteroid injections for his chronic right knee DJD. The last CSI made him feel 80% better over all. Over the past 2-3 weeks his pain began to return, worse with walking, stairs, bending and does wake him at night on occasion. No new injury.   We agreed upon repeating the cortisone injection into his right knee which he tolerated well. He will take prescription acetaminophen. He can continue with his knee brace. Activity modifications were reviewed. He understands that he  will ultimately need knee replacement but we will try to hold this off as long as possible. He will followup in 3-6 months or sooner for recheck.     On 9/8/2022, he returns for an 8-month follow-up of chronic right knee pain with DJD. He is requesting repeat cortisone injection for long-term analgesia to help with pain and swelling secondary to knee arthritis. He reports a previous injection given on 1/12/2022 gave him 6 to 7 months of great relief. It started to wear off and his pain has increased over the past 1 month. He denies interval injury or trauma. We agreed upon repeating the cortisone injection into his right knee which he tolerated well. He will take prescription acetaminophen. He can continue with his knee brace. Activity modifications were reviewed. He understands that he will ultimately need knee replacement but we will try to hold this off as long as possible. He will followup in 3-6 months or sooner for recheck.     On 9/26/2022, he returns for 2-week follow-up of chronic right knee pain with DJD. He has received good benefit from the cortisone injection at his last visit. He is wanting to replace a orthopedic knee brace he has been wearing for several years. Upon reviewing the chart it appears to be from 2015 when I gave it to him. It looks like an [employer] hinge brace. It is falling apart and he no longer is getting good fit. He would like a new one. He denies interval injury or trauma since his last visit.   We agreed upon replacing his previous brace with a custom made DJO climaflex OA lateral  which should give him better relief than his current off-the-shelf hinged brace. I will see him back when his pain returns and his cortisone shot wears off.     On 3/16/2023, he returns for 6-month follow-up of chronic right knee DJD. He reports previous injection given on 9/8/2022 lasted 5 months and then started to wear off. He is very happy with his  knee brace and wears it often. He  denies interval injury or trauma. He is requesting long-term analgesia.   We agreed upon repeating the cortisone injection into his right knee which he tolerated well. He will take prescription acetaminophen. He can continue with his knee brace. Activity modifications were reviewed. He understands that he will ultimately need knee replacement but we will try to hold this off as long as possible. He will followup in 3-6 months or sooner for recheck.     Today, he returns for 7-month follow-up of chronic right knee pain with DJD.  He is requesting repeat cortisone injection for long-term analgesia.  The previous injection given on 3/16/2023 recently started to wear off over the past couple of weeks.  He denies interval injury or trauma.    He has no other complaints.     Physical Examination:     The right knee is without obvious signs of acute bony deformity, swelling, erythema, or ecchymosis. There is trace to grade 1 effusion. The patella is without tenderness. Apprehension is negative with medial and lateral glide. Patella crepitus and patella grind are positive. The medial joint line is mildly tender and without bony crepitus or step-off. The lateral joint line is mildly tender and without bony crepitus or step-off. Flexion & extension are full and symmetrical. There is no instability with stress testing. The opposite knee is nontender and stable. Gait is pain-free and tandem.       Procedure Note:  After consent was obtained, the right knee was prepped in a sterile fashion. Ultrasound guidance was used to help insure proper needle placement into the joint, decrease patient discomfort, and decrease collateral damage. The knee joint was visualized and Depo-Medrol 80 mg with lidocaine 4 mL & ropivacaine 4 mL were injected without any complications. Ultrasound images were saved on an internal file for later reference. The patient tolerated the procedure well and the area was cleaned and bandaged.      Assessment and  Plan:     We reviewed the exam and x-ray findings and discussed the conservative and surgical treatment options. We agreed We agreed upon repeating the cortisone injection into his right knee which he tolerated well. He will take prescription acetaminophen. He can continue with his knee brace. Activity modifications were reviewed. He understands that he will ultimately need knee replacement but we will try to hold this off as long as possible. He will followup in 3-6 months or sooner for recheck.     **This note was dictated using Dragon speech recognition software and was not corrected for spelling or grammatical errors**.    Jaime Rice MD  Sports Medicine Specialist  [facility] [place]

## 2023-10-09 NOTE — PROGRESS NOTES
Patient ID: Sal Kruger is a 63 y.o. male.    L Inj/Asp: R knee on 10/10/2023 3:37 PM  Indications: pain  Details: 22 G needle, ultrasound-guided superolateral approach  Medications: 80 mg methylPREDNISolone acetate 40 mg/mL; 4 mL lidocaine PF 10 mg/mL (1 %)  Procedure, treatment alternatives, risks and benefits explained, specific risks discussed. Consent was given by the patient. Immediately prior to procedure a time out was called to verify the correct patient, procedure, equipment, support staff and site/side marked as required. Patient was prepped and draped in the usual sterile fashion.       Sports Medicine Office Note    Today's Date: 10/9/2023     HPI: Sal Kruger is a 63 y.o.  who presents today for follow-up of chronic right knee pain with DJD.    He has received multiple, serial cortisone injections for his chronic knee DJD. It gives him great relief for up 12 months the last several times. His most recent injection on 02/2020 gave him great relief for over 1 year. His pain has gradually returned over the past 2-3 weeks. He denies injury or trauma. Today, he has dull deep right knee pain and denies injury or trauma. He takes occasional ibuprofen which has been less effective recently. He denies other treatments. He continues to work with his brace. He has no new complaints. He is requesting repeat cortisone injection for analgesia. We agreed upon repeating the cortisone injection into his right knee which he tolerated well. He will take prescription acetaminophen. He can continue working in the knee brace. Activity modifications were reviewed. He understands that he will ultimately need knee replacement but we will try to hold this off as long as possible.      Of note, he had an MI in May 2021 and a second one in June 2021 that required cardiac resuscitation. This led to 6 stents that eventually clotted off and he had triple bypass in December at [facility]. He has recovered very  well.     On 1/12/22 Sal returns today requesting repeat CSI for pain control at his right knee. He has done very well with serial corticosteroid injections for his chronic right knee DJD. The last CSI made him feel 80% better over all. Over the past 2-3 weeks his pain began to return, worse with walking, stairs, bending and does wake him at night on occasion. No new injury.   We agreed upon repeating the cortisone injection into his right knee which he tolerated well. He will take prescription acetaminophen. He can continue with his knee brace. Activity modifications were reviewed. He understands that he will ultimately need knee replacement but we will try to hold this off as long as possible. He will followup in 3-6 months or sooner for recheck.     On 9/8/2022, he returns for an 8-month follow-up of chronic right knee pain with DJD. He is requesting repeat cortisone injection for long-term analgesia to help with pain and swelling secondary to knee arthritis. He reports a previous injection given on 1/12/2022 gave him 6 to 7 months of great relief. It started to wear off and his pain has increased over the past 1 month. He denies interval injury or trauma. We agreed upon repeating the cortisone injection into his right knee which he tolerated well. He will take prescription acetaminophen. He can continue with his knee brace. Activity modifications were reviewed. He understands that he will ultimately need knee replacement but we will try to hold this off as long as possible. He will followup in 3-6 months or sooner for recheck.     On 9/26/2022, he returns for 2-week follow-up of chronic right knee pain with DJD. He has received good benefit from the cortisone injection at his last visit. He is wanting to replace a orthopedic knee brace he has been wearing for several years. Upon reviewing the chart it appears to be from 2015 when I gave it to him. It looks like an [employer] hinge brace. It is falling apart and  he no longer is getting good fit. He would like a new one. He denies interval injury or trauma since his last visit.   We agreed upon replacing his previous brace with a custom made DJO climaflex OA lateral  which should give him better relief than his current off-the-shelf hinged brace. I will see him back when his pain returns and his cortisone shot wears off.     On 3/16/2023, he returns for 6-month follow-up of chronic right knee DJD. He reports previous injection given on 9/8/2022 lasted 5 months and then started to wear off. He is very happy with his  knee brace and wears it often. He denies interval injury or trauma. He is requesting long-term analgesia.   We agreed upon repeating the cortisone injection into his right knee which he tolerated well. He will take prescription acetaminophen. He can continue with his knee brace. Activity modifications were reviewed. He understands that he will ultimately need knee replacement but we will try to hold this off as long as possible. He will followup in 3-6 months or sooner for recheck.     Today, he returns for 7-month follow-up of chronic right knee pain with DJD.  He is requesting repeat cortisone injection for long-term analgesia.  The previous injection given on 3/16/2023 recently started to wear off over the past couple of weeks.  He denies interval injury or trauma.    He has no other complaints.     Physical Examination:     The right knee is without obvious signs of acute bony deformity, swelling, erythema, or ecchymosis. There is trace to grade 1 effusion. The patella is without tenderness. Apprehension is negative with medial and lateral glide. Patella crepitus and patella grind are positive. The medial joint line is mildly tender and without bony crepitus or step-off. The lateral joint line is mildly tender and without bony crepitus or step-off. Flexion & extension are full and symmetrical. There is no instability with stress testing. The  opposite knee is nontender and stable. Gait is pain-free and tandem.       Procedure Note:  After consent was obtained, the right knee was prepped in a sterile fashion. Ultrasound guidance was used to help insure proper needle placement into the joint, decrease patient discomfort, and decrease collateral damage. The knee joint was visualized and Depo-Medrol 80 mg with lidocaine 4 mL & ropivacaine 4 mL were injected without any complications. Ultrasound images were saved on an internal file for later reference. The patient tolerated the procedure well and the area was cleaned and bandaged.      Assessment and Plan:     We reviewed the exam and x-ray findings and discussed the conservative and surgical treatment options. We agreed We agreed upon repeating the cortisone injection into his right knee which he tolerated well. He will take prescription acetaminophen. He can continue with his knee brace. Activity modifications were reviewed. He understands that he will ultimately need knee replacement but we will try to hold this off as long as possible. He will followup in 3-6 months or sooner for recheck.     **This note was dictated using Dragon speech recognition software and was not corrected for spelling or grammatical errors**.    Jaime Rice MD  Sports Medicine Specialist  [facility] [place]

## 2023-10-09 NOTE — DISCHARGE INSTRUCTIONS
The anesthetics, sedatives and pain killers which were given to you will be acting in your body for the next 24 hours. This may cause you to feel sleepy. This feeling will slowly wear off. For the next 24 hours you SHOULD NOT:    Drive a car  Operate machinery or power tools  Drink any form of alcohol, beer or wine.  Make any important decisions or sign and legal documents.    You may eat anything as long as your physician has not warned you to stay away from certain foods. However, it is better to start with liquids,  then progress to softer foods, and gradually work up to solid foods.    We strongly suggest that a responsible adult be with you for the rest of the day and also the night. This is for your protection and safety since you may not be as alert as usual. You should be especially careful climbing stairs.     If you experience bleeding, fever, shortness of breath, chest pain, or extreme abdominal pain go to the nearest Emergency Room.      [facility] Phone Number [phone]

## 2023-10-09 NOTE — ANESTHESIA POSTPROCEDURE EVALUATION
Patient: Sal Kruger    Procedure Summary       Date: 10/09/23 Room / Location: [facility]    Anesthesia Start: 1134 Anesthesia Stop:     Procedure: COLONOSCOPY Diagnosis: Encounter for screening for malignant neoplasm of colon    Scheduled Providers: Han Riggs MD Responsible Provider: SUMMER Olivarez    Anesthesia Type: MAC ASA Status: 3            Anesthesia Type: MAC    Vitals Value Taken Time   /76 10/09/23 1220   Temp 36.6 10/09/23 1220   Pulse 65 10/09/23 1220   Resp 14 10/09/23 1220   SpO2 96 10/09/23 1220       Anesthesia Post Evaluation    Patient location during evaluation: bedside  Patient participation: complete - patient participated  Level of consciousness: awake  Pain score: 0  Pain management: adequate  Airway patency: patent  Cardiovascular status: acceptable and blood pressure returned to baseline  Respiratory status: acceptable  Hydration status: acceptable        No notable events documented.

## 2023-10-12 DIAGNOSIS — D12.6 TUBULAR ADENOMA OF COLON: Primary | ICD-10-CM

## 2023-10-16 DIAGNOSIS — E78.5 DYSLIPIDEMIA, GOAL LDL BELOW 70: ICD-10-CM

## 2023-10-16 DIAGNOSIS — I10 ESSENTIAL HYPERTENSION: ICD-10-CM

## 2023-10-16 DIAGNOSIS — E11.9 TYPE 2 DIABETES MELLITUS WITHOUT COMPLICATION, WITHOUT LONG-TERM CURRENT USE OF INSULIN (MULTI): Primary | ICD-10-CM

## 2023-10-16 RX ORDER — METFORMIN HYDROCHLORIDE 1000 MG/1
1000 TABLET ORAL
Qty: 180 TABLET | Refills: 3 | Status: SHIPPED | OUTPATIENT
Start: 2023-10-16 | End: 2024-10-15

## 2023-10-16 RX ORDER — ATORVASTATIN CALCIUM 80 MG/1
80 TABLET, FILM COATED ORAL DAILY
Qty: 90 TABLET | Refills: 3 | Status: SHIPPED | OUTPATIENT
Start: 2023-10-16 | End: 2024-10-15

## 2023-10-16 RX ORDER — LISINOPRIL 40 MG/1
40 TABLET ORAL DAILY
Qty: 90 TABLET | Refills: 3 | Status: SHIPPED | OUTPATIENT
Start: 2023-10-16 | End: 2024-06-03 | Stop reason: DRUGHIGH

## 2023-10-16 RX ORDER — CALCIUM CITRATE/VITAMIN D3 200MG-6.25
1 TABLET ORAL DAILY
Qty: 100 STRIP | Refills: 3 | Status: SHIPPED | OUTPATIENT
Start: 2023-10-16 | End: 2024-10-15

## 2023-10-19 LAB
LABORATORY COMMENT REPORT: NORMAL
PATH REPORT.FINAL DX SPEC: NORMAL
PATH REPORT.GROSS SPEC: NORMAL
PATH REPORT.TOTAL CANCER: NORMAL

## 2023-11-13 ENCOUNTER — OFFICE VISIT (OUTPATIENT)
Dept: PRIMARY CARE | Facility: CLINIC | Age: 63
End: 2023-11-13
Payer: COMMERCIAL

## 2023-11-13 VITALS
HEIGHT: 70 IN | OXYGEN SATURATION: 97 % | BODY MASS INDEX: 35.5 KG/M2 | SYSTOLIC BLOOD PRESSURE: 134 MMHG | RESPIRATION RATE: 16 BRPM | DIASTOLIC BLOOD PRESSURE: 80 MMHG | WEIGHT: 248 LBS | TEMPERATURE: 98.6 F | HEART RATE: 62 BPM

## 2023-11-13 DIAGNOSIS — Z23 NEED FOR INFLUENZA VACCINATION: Chronic | ICD-10-CM

## 2023-11-13 DIAGNOSIS — E66.01 CLASS 2 SEVERE OBESITY WITH SERIOUS COMORBIDITY AND BODY MASS INDEX (BMI) OF 35.0 TO 35.9 IN ADULT, UNSPECIFIED OBESITY TYPE (MULTI): Chronic | ICD-10-CM

## 2023-11-13 DIAGNOSIS — E78.5 DYSLIPIDEMIA, GOAL LDL BELOW 70: Chronic | ICD-10-CM

## 2023-11-13 DIAGNOSIS — M54.32 SCIATICA OF LEFT SIDE: Primary | Chronic | ICD-10-CM

## 2023-11-13 DIAGNOSIS — I10 ESSENTIAL HYPERTENSION WITH GOAL BLOOD PRESSURE LESS THAN 130/85: Chronic | ICD-10-CM

## 2023-11-13 DIAGNOSIS — R35.1 BENIGN PROSTATIC HYPERPLASIA WITH NOCTURIA: Chronic | ICD-10-CM

## 2023-11-13 DIAGNOSIS — N40.1 BENIGN PROSTATIC HYPERPLASIA WITH NOCTURIA: Chronic | ICD-10-CM

## 2023-11-13 DIAGNOSIS — E11.9 TYPE 2 DIABETES MELLITUS WITHOUT COMPLICATION, WITHOUT LONG-TERM CURRENT USE OF INSULIN (MULTI): Chronic | ICD-10-CM

## 2023-11-13 PROBLEM — I46.9 CARDIAC ARREST WITH VENTRICULAR FIBRILLATION (MULTI): Status: RESOLVED | Noted: 2023-05-12 | Resolved: 2023-11-13

## 2023-11-13 PROBLEM — I49.01 CARDIAC ARREST WITH VENTRICULAR FIBRILLATION (MULTI): Status: RESOLVED | Noted: 2023-05-12 | Resolved: 2023-11-13

## 2023-11-13 PROCEDURE — 1159F MED LIST DOCD IN RCRD: CPT | Performed by: INTERNAL MEDICINE

## 2023-11-13 PROCEDURE — 3079F DIAST BP 80-89 MM HG: CPT | Performed by: INTERNAL MEDICINE

## 2023-11-13 PROCEDURE — 1036F TOBACCO NON-USER: CPT | Performed by: INTERNAL MEDICINE

## 2023-11-13 PROCEDURE — 3008F BODY MASS INDEX DOCD: CPT | Performed by: INTERNAL MEDICINE

## 2023-11-13 PROCEDURE — 4010F ACE/ARB THERAPY RXD/TAKEN: CPT | Performed by: INTERNAL MEDICINE

## 2023-11-13 PROCEDURE — 3044F HG A1C LEVEL LT 7.0%: CPT | Performed by: INTERNAL MEDICINE

## 2023-11-13 PROCEDURE — 1126F AMNT PAIN NOTED NONE PRSNT: CPT | Performed by: INTERNAL MEDICINE

## 2023-11-13 PROCEDURE — 90662 IIV NO PRSV INCREASED AG IM: CPT | Performed by: INTERNAL MEDICINE

## 2023-11-13 PROCEDURE — 1160F RVW MEDS BY RX/DR IN RCRD: CPT | Performed by: INTERNAL MEDICINE

## 2023-11-13 PROCEDURE — 90471 IMMUNIZATION ADMIN: CPT | Performed by: INTERNAL MEDICINE

## 2023-11-13 PROCEDURE — 99397 PER PM REEVAL EST PAT 65+ YR: CPT | Performed by: INTERNAL MEDICINE

## 2023-11-13 PROCEDURE — 3075F SYST BP GE 130 - 139MM HG: CPT | Performed by: INTERNAL MEDICINE

## 2023-11-13 ASSESSMENT — PATIENT HEALTH QUESTIONNAIRE - PHQ9
1. LITTLE INTEREST OR PLEASURE IN DOING THINGS: NOT AT ALL
SUM OF ALL RESPONSES TO PHQ9 QUESTIONS 1 AND 2: 0
2. FEELING DOWN, DEPRESSED OR HOPELESS: NOT AT ALL

## 2023-11-13 ASSESSMENT — ENCOUNTER SYMPTOMS
DEPRESSION: 0
LOSS OF SENSATION IN FEET: 0
OCCASIONAL FEELINGS OF UNSTEADINESS: 0

## 2023-11-13 NOTE — PROGRESS NOTES
"Subjective   Patient ID: Sal Kruger is a 63 y.o. male who presents for Annual Exam.    Here for wellness visit.  He has had pain down his left leg since driving back from [place] and a small [employer] rental car.  Occasionally goes down to his left heel.  Tends to wax and wane, more noticeable sitting in his recliner    He is left-handed.  Recently he has noticed a nodule near his right wrist.  No trauma no injury         Review of Systems    Objective   /80   Pulse 62   Temp 37 °C (98.6 °F)   Resp 16   Ht 1.778 m (5' 10\")   Wt 112 kg (248 lb)   SpO2 97%   BMI 35.58 kg/m²     Physical Exam  Constitutional:       Appearance: Normal appearance. He is obese.   HENT:      Head: Normocephalic and atraumatic.      Right Ear: Tympanic membrane normal.      Left Ear: Tympanic membrane normal.      Nose: Nose normal.   Eyes:      General: No scleral icterus.     Extraocular Movements: Extraocular movements intact.      Conjunctiva/sclera: Conjunctivae normal.      Pupils: Pupils are equal, round, and reactive to light.   Cardiovascular:      Rate and Rhythm: Normal rate and regular rhythm.      Pulses: Normal pulses.      Heart sounds: Normal heart sounds. No murmur heard.  Pulmonary:      Effort: Pulmonary effort is normal. No respiratory distress.      Breath sounds: Normal breath sounds. No stridor. No wheezing.   Abdominal:      General: Abdomen is flat. Bowel sounds are normal. There is no distension.      Palpations: Abdomen is soft. There is no mass.      Tenderness: There is no abdominal tenderness. There is no guarding.   Musculoskeletal:         General: No swelling, tenderness or deformity. Normal range of motion.      Cervical back: Normal range of motion and neck supple. No tenderness.      Comments: He had a fleshy like nodule over his wrist joint towards the base of his thumb on the volar aspect consistent with a ganglion.   Lymphadenopathy:      Cervical: No cervical adenopathy.   Skin:    "  General: Skin is warm and dry.      Findings: No lesion or rash.   Neurological:      General: No focal deficit present.      Mental Status: He is alert and oriented to person, place, and time.      Cranial Nerves: No cranial nerve deficit.      Motor: No weakness.      Comments: He described left lumbar radicular pattern of his symptoms extending from his left SI joint towards his left heel   Psychiatric:         Mood and Affect: Mood normal.         Behavior: Behavior normal.         Thought Content: Thought content normal.         Judgment: Judgment normal.         Assessment/Plan   Problem List Items Addressed This Visit             ICD-10-CM    Benign enlargement of prostate N40.0    Relevant Orders    Prostate Specific Antigen    Essential hypertension with goal blood pressure less than 130/85 I10    Relevant Orders    Basic Metabolic Panel    Dyslipidemia, goal LDL below 70 E78.5    Relevant Orders    TSH with reflex to Free T4 if abnormal    Obesity E66.9    Type 2 diabetes mellitus without complication, without long-term current use of insulin (CMS/[facility]) E11.9    Relevant Orders    Hemoglobin A1C    Need for influenza vaccination Z23    Relevant Orders    Flu vaccine, quadrivalent, high-dose, preservative free, age 65y+ (FLUZONE) (Completed)    Sciatica of left side - Primary M54.32    Relevant Orders    Referral to Physical Therapy    XR lumbar spine 2-3 views               CAD s/p 3vCABG Dec '19 - 4 cardiac stents placed May 2019 followed by 2 more stents after his cardiac arrest late June 2019 per Dr. Li clinically doing very well. Now back at work. He states that elected not to pursue cardiac rehabilitation.         He will see Dr. Li in cardiology annually. Medications reviewed. He remains quite compliant.      Status post V. fib cardiac arrest 6/27/19- amazingly he has done well following urgent catheterization. He saw Dr. Enriquez at that time but no longer follows up with him.      Hypertension/dyslipidemia- he will continue his meds. Goal LDL <70;   LDL = 35 Oct '22           11/23-LDL 38, BMI 35.  Blood pressure borderline elevated.  His home blood pressure machine has been checked and it correlates.  He will check this several times weekly and send an average in understanding that if the average is consistently over 130 he will need additional medication     Diabetes/BMI over 30- we spoke at length in the past regarding ways to optimize his coverage. Home glucose values appear good so far and somewhat improved. Tolerating metformin           May '23; A1c slightly increased towards 6.4%.  Considering his BMI, discussed semaglutide.  He will meet with our pharmacy team to see if this might be an option             11/23-A1c 6.3%.  BMI 35.  He will continue his medication option and will reassess labs in 4 months.  Weight loss remains a primary goal he understands    exercise routine-though he works full-time as an  is not exercising regularly. Suggested recumbent cycle and efforts to lose weight in the past. Cardiac rehabilitation occurred after his cardiac arrest but not after his CABG    Exercising less w/ caregiver responsibilities. He'll advance this as he can     Costochondritis/chest pain syndrome-since his heart bypass he is occasionally gets central substernal pain with position changes. Discussed rib anatomy. He has reviewed this with Dr. Li. This is clearly different from his angina which he has not had. He understands if he does experience any of the angina symptoms that he had before his cardiac arrest-he needs to call 911.       Sleep apnea/history of pulmonary nodule- Negative pulm eval. RE nodule per Dr. Moore. He's tolerated the CPAP for some 15 yrs. He understands pulmonologist f/u is necessary for machine. encouraged him to do. He will see Dr. Mccall in March. He just got a new CPAP machine in 2020- doing better               He remains compliant w/  "this     History of fatty liver/elevated LFTs- evaluated by Dr. Weber in the liver clinic in the past including a biopsy 8/16 -negative. Recent LFTs normal     Sciatica-right side-encourage Ana exercises optimizing sleep in sitting position and call if not improved understanding physical therapy would be in order. Right-sided sciatica continued-when he has done this Ana exercises this helps. Encouraged optimal position and posture particularly while sitting.              11/23-left sciatica an issue since driving home from [place] in a smaller rental car-a [place] earlier this fall.  He will optimize his position try to sleep in his bed on his back with his knees elevated Ana exercises encouraged and follow-up in PT if not improved    Ganglion-right wrist-he is left-handed-he will follow this and notify me if this enlarges or changes or becomes painful     Bilateral carpal tunnel - both improved w/ splints nightly. he'll f/u w/ Dr. Mata w/ concerns. using splints nightly, with ongoing symptoms, right hand a bit worse than his left, though he is left-handed. He's considering surgery next year right-sided hamstring strain-noted today. He will follow accordingly with stretching     Advanced Right knee DJD- improved following 2 injections by Dr. Rice in 2017. He will follow up as needed. \"OK for the last 2 months\". Right knee replacement will need to be pursued at some point-but now on hold indefinitely.   He will continue to see Dr. Rice for injections in his right knee which so far have helped. He also received a knee brace for greater stability. Right knee injected 9/22     Left elbow pain - improved w/ splint     Bilateral inguinal hernias/ umbilical hernia-caution with lifting. We'll follow. Umbilical hernia not problematic     Adenomatous colon polyps-  Colonoscopy completed December 2021 but unfortunately the prep was bad and the procedure was aborted so he was told to come back in a " year-2022            Colonoscopy updated early May '23. Poor prep, and 1 adenomatous polyp. Next in 4-5 months w/ 2 day prep            10/23-colonoscopy completed-multiple polyps-next colonoscopy in 1 year-10/24    Diverticulosis-severe on 10/23 colonoscopy.  He is now on MiraLAX per GI     BPH- annual PSA continues for prostate cancer screening. Nocturia not a present concern. PSA normal May 2023     Vitamin D deficiency-he will advance this with his 2020 vitamin D on the low side still.        Dentures-he no longer follows in the dental clinic     Bifocal/Glasses/vision care- patient will continue routine vision exams now annually with diabetes. New glasses early . His exams continue annually he [id number]-[id number]-date     History of Basal cell cheek lesion excised by Mohs- he will continue visits in dermatology-- now as needed. He understands importance of sunscreen accordingly. He has some skin tags to remove, he'll see derm w/ concerns. encouraged sun screen     Nose lesion- Mohs surgery to address lesions. He will follow up as needed. No concerns presently.      Family concerns/bereavement-his younger brother  May 2023 after fall and head injury.  He was 60 years old, with MS.  He has another brother who is in [place] who he visited summer 2023-he is not doing that well he notes     Covid series completed. Covid Booster completed. Additional booster shot discussed.     Prevnar 20 after age 65-we will do this next time     Flu shot each fall-updated 2023-today      Follow-up as scheduled every 4-5 months to review labs, sooner as needed     Charting was completed using voice recognition technology and may include unintended errors.

## 2023-11-14 PROBLEM — M54.32 SCIATICA OF LEFT SIDE: Status: ACTIVE | Noted: 2023-11-14

## 2023-11-14 PROBLEM — Z23 NEED FOR INFLUENZA VACCINATION: Status: ACTIVE | Noted: 2023-11-14

## 2023-11-14 RX ORDER — POLYETHYLENE GLYCOL 3350 17 G/17G
17 POWDER, FOR SOLUTION ORAL DAILY
COMMUNITY
End: 2024-02-29 | Stop reason: ALTCHOICE

## 2023-11-24 ENCOUNTER — ANCILLARY PROCEDURE (OUTPATIENT)
Dept: RADIOLOGY | Facility: CLINIC | Age: 63
End: 2023-11-24
Payer: COMMERCIAL

## 2023-11-24 DIAGNOSIS — M54.32 SCIATICA OF LEFT SIDE: Chronic | ICD-10-CM

## 2023-11-24 PROCEDURE — 72100 X-RAY EXAM L-S SPINE 2/3 VWS: CPT | Mod: FY

## 2023-11-24 PROCEDURE — 72100 X-RAY EXAM L-S SPINE 2/3 VWS: CPT | Performed by: RADIOLOGY

## 2023-12-21 ENCOUNTER — APPOINTMENT (OUTPATIENT)
Dept: CARDIOLOGY | Facility: CLINIC | Age: 63
End: 2023-12-21
Payer: COMMERCIAL

## 2023-12-22 ENCOUNTER — EVALUATION (OUTPATIENT)
Dept: PHYSICAL THERAPY | Facility: CLINIC | Age: 63
End: 2023-12-22
Payer: COMMERCIAL

## 2023-12-22 DIAGNOSIS — M54.32 SCIATICA OF LEFT SIDE: Chronic | ICD-10-CM

## 2023-12-22 DIAGNOSIS — M79.605 LEG PAIN, POSTERIOR, LEFT: Primary | ICD-10-CM

## 2023-12-22 PROCEDURE — 97161 PT EVAL LOW COMPLEX 20 MIN: CPT | Mod: GP

## 2023-12-22 ASSESSMENT — PAIN SCALES - GENERAL: PAINLEVEL_OUTOF10: 0 - NO PAIN

## 2023-12-22 ASSESSMENT — ENCOUNTER SYMPTOMS
DEPRESSION: 0
OCCASIONAL FEELINGS OF UNSTEADINESS: 0
LOSS OF SENSATION IN FEET: 0

## 2023-12-22 ASSESSMENT — PAIN - FUNCTIONAL ASSESSMENT: PAIN_FUNCTIONAL_ASSESSMENT: 0-10

## 2023-12-22 NOTE — PROGRESS NOTES
"Physical Therapy    Physical Therapy Evaluation and Treatment      Patient Name: Sal Kruger  MRN: [id number]  Today's Date: 12/22/2023  Time Calculation  Start Time: 0810  Stop Time: 0850  Time Calculation (min): 40 min    Insurance:  1900 OOP MAX, 25 VS FABIAN YR, NO AUTH    Assessment:  PT Assessment Results: Decreased strength, Decreased mobility  Rehab Prognosis: Good  Pt is a 63 year old male presenting to PT with L sided LE pain. Standardized testing of MMT, special tests and overall mobility reveal that pt has multiple impairments in body structures/functions, activity limitations and participation restrictions. These include subjective and objective findings such as pain with mobility and L LE weakness. Pt to benefit from skilled PT interventions to improve functional mobility deficits to improve independence and decrease fall risk.      Plan:  Treatment/Interventions: Education/ Instruction, Dry needling, Gait training, Neuromuscular re-education, Taping techniques, Therapeutic activities, Therapeutic exercises  PT Plan: Skilled PT  PT Frequency: 1 time per week  Duration: 4 weeks      Current Problem:   1. Leg pain, posterior, left        2. Sciatica of left side  Referral to Physical Therapy          Subjective     Pt reports to PT ambulatory without a device. Pt reports onset of L sided posterior leg pain, starting back in September. The pain in the L LE radiates from butt down to foot which is worse in the morning. Pain is less when sitting and leaning toward the R.  At its worst pain radiates up to a 9/10, stabbing in nature, at best at a 0/10. Additionally, pt reports issues with balance over the last nine months, at times tripping over things and on uneven terrain.       Pain:   Pain Assessment  Pain Assessment: 0-10  Pain Score: 0 - No pain      Prior Level of Function:    Pt currently works full time as an .   Patient goal;  \"Manage pain\"      Objective   Right lower extremity  Hip " flexion; 5/5  Hip abduction; 4+/5  Hip adduction; 4+/5   Knee flexion; 4/5  Knee extension; 3+/5  Ankle dorsiflexion; 4/5  Ankle plantarflexion; 4/5    Left lower extremity  Hip flexion; 4-/5  Hip abduction; 4-/5  Hip adduction; 4-/5   Knee flexion; 4/5  Knee extension; 4/5  Ankle dorsiflexion; 4/5  Ankle plantarflexion; 4/5    Back mobility  No pain with flexion/extension/lateral flexion/rotation  Symmetrical mobility in standing    Sensation:  Denies N/T    Special tests  JACQUELINE; negative for pain  FADDIR; negative for pain  Piriformis stretch; pain with stretch  Favian; tightness in L hip  Slump test; tension in L posterior leg    Outcome Measures:  Other Measures  Oswestry Disablity Index (LEANN): 30%     Treatments:  Education and discussion on HEP and treatment regarding the benefits related to current condition, POC, pathophysiology, and precautions    HEP  -Piriformis stretch  -Hamstring stretch  -Hip flexor stretch    Goals:  Pt will improve LEANN by 13% to meet MCID  Pt will report 0 falls during POC  Pt will report a decrease of pain at max to 6/10  Pt will demonstrate independence with HEP

## 2024-02-01 ENCOUNTER — APPOINTMENT (OUTPATIENT)
Dept: CARDIOLOGY | Facility: CLINIC | Age: 64
End: 2024-02-01
Payer: COMMERCIAL

## 2024-02-29 ENCOUNTER — OFFICE VISIT (OUTPATIENT)
Dept: CARDIOLOGY | Facility: CLINIC | Age: 64
End: 2024-02-29
Payer: COMMERCIAL

## 2024-02-29 VITALS
SYSTOLIC BLOOD PRESSURE: 148 MMHG | WEIGHT: 240 LBS | OXYGEN SATURATION: 96 % | DIASTOLIC BLOOD PRESSURE: 80 MMHG | HEIGHT: 70 IN | HEART RATE: 74 BPM | BODY MASS INDEX: 34.36 KG/M2

## 2024-02-29 DIAGNOSIS — E78.5 DYSLIPIDEMIA, GOAL LDL BELOW 70: ICD-10-CM

## 2024-02-29 DIAGNOSIS — I10 ESSENTIAL HYPERTENSION WITH GOAL BLOOD PRESSURE LESS THAN 130/85: ICD-10-CM

## 2024-02-29 DIAGNOSIS — I25.10 CORONARY ARTERY DISEASE INVOLVING NATIVE CORONARY ARTERY OF NATIVE HEART WITHOUT ANGINA PECTORIS: Primary | ICD-10-CM

## 2024-02-29 PROCEDURE — 99214 OFFICE O/P EST MOD 30 MIN: CPT | Performed by: INTERNAL MEDICINE

## 2024-02-29 PROCEDURE — 93000 ELECTROCARDIOGRAM COMPLETE: CPT | Performed by: INTERNAL MEDICINE

## 2024-02-29 PROCEDURE — 3077F SYST BP >= 140 MM HG: CPT | Performed by: INTERNAL MEDICINE

## 2024-02-29 PROCEDURE — 1036F TOBACCO NON-USER: CPT | Performed by: INTERNAL MEDICINE

## 2024-02-29 PROCEDURE — 1126F AMNT PAIN NOTED NONE PRSNT: CPT | Performed by: INTERNAL MEDICINE

## 2024-02-29 PROCEDURE — 1159F MED LIST DOCD IN RCRD: CPT | Performed by: INTERNAL MEDICINE

## 2024-02-29 PROCEDURE — 3008F BODY MASS INDEX DOCD: CPT | Performed by: INTERNAL MEDICINE

## 2024-02-29 PROCEDURE — 3079F DIAST BP 80-89 MM HG: CPT | Performed by: INTERNAL MEDICINE

## 2024-02-29 PROCEDURE — 4010F ACE/ARB THERAPY RXD/TAKEN: CPT | Performed by: INTERNAL MEDICINE

## 2024-02-29 PROCEDURE — 1160F RVW MEDS BY RX/DR IN RCRD: CPT | Performed by: INTERNAL MEDICINE

## 2024-02-29 ASSESSMENT — PAIN SCALES - GENERAL: PAINLEVEL: 0-NO PAIN

## 2024-02-29 NOTE — PROGRESS NOTES
Chief Complaint:   No chief complaint on file.     History Of Present Illness:    Sal Kruger is a 63 y.o. male with a history of CAD s/p CABG 2019, hypertension, and dyslipidemia who is being evaluated for follow-up.     He has been doing well.  He denies any exertional chest pain or shortness of breath.  He is going to be retiring in August.    Unfortunately his wife had a cerebral aneurysm that ruptured.  Fortunately it was caught enough time to be treated.  She is suffering from short-term memory loss now however it is slightly improving.     Three-vessel CABG 12/5/19 with LIMA-LAD, SVG-OM, and VG-diagonal     Catheterization 11/1/19 with severe coronary disease.      PCI to the circumflex and diagonal branch 6/28/19. Circumflex ostium stented with a Resolute Dave 2.5 x 12 mm PAUL. Third diagonal branch with 85% proximal stenosis. Resolute dave 2.5 x 8 mm PAUL.     Catheterization 5/10/19 for non-ST elevation MI. PCI to the RCA with overlapping Resolute [place] PAUL. Ostial RCA with 4.0 x 20 mm, mid with 3.0 x 22 mm, distal with 2.5 x 20 mm. Right PDA with a 2.5 x 22 mm Resolute Dave PAUL.  Left main: Mild disease.   LAD with mild disease. First large diagonal branch with 90% proximal stenosis.   Circumflex with ostial 80% stenosis.     Echocardiogram 5/11/19 with normal LV size and function, EF 60-65%.      Past Medical History:  He has a past medical history of Body mass index (BMI) 33.0-33.9, adult (06/24/2020), Body mass index (BMI) 35.0-35.9, adult (09/28/2021), Body mass index (BMI) 35.0-35.9, adult (05/20/2018), Body mass index (BMI) 36.0-36.9, adult (05/18/2021), Body mass index (BMI) 36.0-36.9, adult (10/05/2018), Body mass index (BMI) 37.0-37.9, adult (03/01/2019), Cough, unspecified, Elevation of levels of liver transaminase levels, Encounter for other preprocedural examination (11/19/2019), Fatty (change of) liver, not elsewhere classified, Hypertension, Liver disease, unspecified (05/13/2017),  Other conditions influencing health status (10/14/2016), Other specified diabetes mellitus with other oral complications (CMS/[facility]) (05/31/2022), Overweight (07/04/2015), Pain in unspecified knee (04/30/2015), Personal history of colonic polyps, Personal history of other diseases of the nervous system and sense organs (01/15/2015), Personal history of other endocrine, nutritional and metabolic disease (10/14/2016), Personal history of other malignant neoplasm of skin (11/11/2022), Personal history of other specified conditions (11/06/2019), Personal history of sudden cardiac arrest (08/02/2019), Prediabetes (01/15/2015), Solitary pulmonary nodule (01/06/2018), Strain of unspecified achilles tendon, initial encounter (07/04/2015), Subsequent non-ST elevation (NSTEMI) myocardial infarction (CMS/[facility]) (06/21/2019), and Unilateral primary osteoarthritis, unspecified knee (06/08/2015).    Past Surgical History:  He has a past surgical history that includes Ventral hernia repair (03/26/2014); Lithotripsy (01/15/2016); Other surgical history (01/15/2016); Skin cancer excision (01/18/2016); Other surgical history (12/11/2019); and US guided needle liver biopsy (8/10/2016).      Social History:  He reports that he quit smoking about 36 years ago. His smoking use included cigarettes. He smoked an average of 1 pack per day. He has never used smokeless tobacco. He reports current alcohol use. He reports that he does not use drugs.    Family History:  No family history on file.     Allergies:  Patient has no known allergies.    Outpatient Medications:  Current Outpatient Medications   Medication Instructions    acetaminophen 500 mg capsule oral    aspirin 81 mg, oral, 2 times daily    atorvastatin (LIPITOR) 80 mg, oral, Daily, for high cholesterol    cholecalciferol (Vitamin D-3) 50 mcg (2,000 unit) capsule oral    glimepiride (Amaryl) 2 mg tablet TAKE 1 (ONE) TABLET BY MOUTH EVERY DAY at dinner FOR DIABETES    ibuprofen 200 mg  "tablet oral    lisinopril 40 mg, oral, Daily, for blood pressure    metFORMIN (GLUCOPHAGE) 1,000 mg, oral, 2 times daily with meals    metoprolol tartrate (LOPRESSOR) 12.5 mg, oral, 2 times daily    True Metrix Glucose Test Strip strip 1 strip, subcutaneous, Daily       Last Recorded Vitals:  Visit Vitals  /80 (BP Location: Left arm, Patient Position: Sitting)   Pulse 74   Ht 1.778 m (5' 10\")   Wt 109 kg (240 lb)   SpO2 96%   BMI 34.44 kg/m²   Smoking Status Former   BSA 2.32 m²      LASTWT(3):   Wt Readings from Last 3 Encounters:   02/29/24 109 kg (240 lb)   11/13/23 112 kg (248 lb)   10/09/23 104 kg (230 lb)       Physical Exam:  In general: alert and in no acute distress.   HEENT: Carotid upstrokes normal with no bruits. JVP is normal.   Pulmonary: Clear to auscultation bilaterally.  Cardiovascular: S1,S2, regular. No appreciable murmurs, rubs or gallops.   Lower extremities: Warm. 2+ distal pulses. No edema.     Last Labs:  CBC -  Recent Labs     06/20/20  0805 12/09/19  0358 12/08/19  0446   WBC 8.5 10.2 13.2*   HGB 15.9 12.5* 12.6*   HCT 49.4 38.2* 39.2*    191 165   MCV 95 92 94       CMP -  Recent Labs     09/21/23  0716 04/24/23  1615 10/08/22  0809 06/20/20  0805 12/09/19  0358 12/08/19  0446 12/07/19  2034    140 140   < > 137 138  --    K 4.4 4.0 4.3   < > 4.1 4.2 4.4    102 106   < > 100 101  --    CO2 28 25 24   < > 28 28  --    ANIONGAP 13 17 14   < > 13 13  --    BUN 19 21 21   < > 22 17  --    CREATININE 0.96 1.09 0.94   < > 0.89 0.87  --    MG  --   --   --   --  2.20 2.20 2.60*    < > = values in this interval not displayed.     Recent Labs     09/21/23  0716 10/08/22  0809 01/15/22  0803 06/20/20  0805 12/09/19  0358 12/08/19  0446 12/05/19  1408 12/02/19  1228   ALBUMIN 4.3  --   --   --  3.6 3.8   < > 4.7   ALKPHOS 51  --   --   --   --  51  --  49   ALT 34 38 34   < >  --  28  --  30   AST 29  --   --   --   --  20  --  24   BILITOT 0.5  --   --   --   --  0.7  --  " 0.6    < > = values in this interval not displayed.       LIPID PANEL -   Recent Labs     09/21/23  0716 10/08/22  0809 01/15/22  0803   CHOL 112 112 100   LDLF 38 35 31   HDL 38.3* 38.0* 40.0   TRIG 179* 193* 147       Recent Labs     09/21/23  0716 04/24/23  1615 10/08/22  0809 06/21/19  0728 05/10/19  1414   BNP  --   --   --   --  63   HGBA1C 6.3* 6.4* 5.9*   < >  --     < > = values in this interval not displayed.           Assessment/Plan   1) CAD: Status post CABG 2019. Has done well. No complaints of chest pain or shortness of breath. ECG unchanged.     Spoke about the ISCHEMIA Trial. No reason for routine stress testing.    Would like an echocardiogram as it has been 5 years since his last echocardiogram.  However, because of his wife's ongoing medical issues at the current time, I have decided that we will wait and discuss this next year.     2) dyslipidemia: LDL very well controlled. Continue statin     3) hypertension: No changes to medications.     4) follow-up: 12 months or sooner if needed.       Lake Li MD

## 2024-03-07 DIAGNOSIS — I25.10 ATHEROSCLEROTIC HEART DISEASE OF NATIVE CORONARY ARTERY WITHOUT ANGINA PECTORIS: ICD-10-CM

## 2024-03-08 RX ORDER — METOPROLOL TARTRATE 25 MG/1
12.5 TABLET, FILM COATED ORAL 2 TIMES DAILY
Qty: 30 TABLET | Refills: 11 | Status: SHIPPED | OUTPATIENT
Start: 2024-03-08 | End: 2024-06-03 | Stop reason: SDUPTHER

## 2024-04-12 DIAGNOSIS — R73.03 PREDIABETES: ICD-10-CM

## 2024-04-13 RX ORDER — GLIMEPIRIDE 2 MG/1
TABLET ORAL
Qty: 90 TABLET | Refills: 3 | Status: SHIPPED | OUTPATIENT
Start: 2024-04-13

## 2024-06-03 ENCOUNTER — LAB (OUTPATIENT)
Dept: LAB | Facility: LAB | Age: 64
End: 2024-06-03
Payer: COMMERCIAL

## 2024-06-03 ENCOUNTER — OFFICE VISIT (OUTPATIENT)
Dept: PRIMARY CARE | Facility: CLINIC | Age: 64
End: 2024-06-03
Payer: COMMERCIAL

## 2024-06-03 VITALS
HEART RATE: 67 BPM | DIASTOLIC BLOOD PRESSURE: 80 MMHG | OXYGEN SATURATION: 97 % | TEMPERATURE: 98 F | HEIGHT: 70 IN | BODY MASS INDEX: 34.43 KG/M2 | SYSTOLIC BLOOD PRESSURE: 140 MMHG | WEIGHT: 240.52 LBS | RESPIRATION RATE: 16 BRPM

## 2024-06-03 DIAGNOSIS — R93.1 AGATSTON CORONARY ARTERY CALCIUM SCORE GREATER THAN 400: Primary | ICD-10-CM

## 2024-06-03 DIAGNOSIS — E78.5 DYSLIPIDEMIA, GOAL LDL BELOW 70: ICD-10-CM

## 2024-06-03 DIAGNOSIS — I25.10 ATHEROSCLEROTIC HEART DISEASE OF NATIVE CORONARY ARTERY WITHOUT ANGINA PECTORIS: ICD-10-CM

## 2024-06-03 DIAGNOSIS — Z97.3 WEARS GLASSES: ICD-10-CM

## 2024-06-03 DIAGNOSIS — I10 ESSENTIAL HYPERTENSION WITH GOAL BLOOD PRESSURE LESS THAN 130/85: ICD-10-CM

## 2024-06-03 DIAGNOSIS — N40.1 BENIGN PROSTATIC HYPERPLASIA WITH NOCTURIA: Chronic | ICD-10-CM

## 2024-06-03 DIAGNOSIS — Z95.1 S/P CABG (CORONARY ARTERY BYPASS GRAFT): ICD-10-CM

## 2024-06-03 DIAGNOSIS — I10 ESSENTIAL HYPERTENSION WITH GOAL BLOOD PRESSURE LESS THAN 130/85: Chronic | ICD-10-CM

## 2024-06-03 DIAGNOSIS — E78.5 DYSLIPIDEMIA, GOAL LDL BELOW 70: Chronic | ICD-10-CM

## 2024-06-03 DIAGNOSIS — E11.9 TYPE 2 DIABETES MELLITUS WITHOUT COMPLICATION, WITHOUT LONG-TERM CURRENT USE OF INSULIN (MULTI): Chronic | ICD-10-CM

## 2024-06-03 DIAGNOSIS — E11.9 TYPE 2 DIABETES MELLITUS WITHOUT COMPLICATION, WITHOUT LONG-TERM CURRENT USE OF INSULIN (MULTI): ICD-10-CM

## 2024-06-03 DIAGNOSIS — I10 ESSENTIAL HYPERTENSION: ICD-10-CM

## 2024-06-03 DIAGNOSIS — R35.1 BENIGN PROSTATIC HYPERPLASIA WITH NOCTURIA: Chronic | ICD-10-CM

## 2024-06-03 DIAGNOSIS — I25.10 CORONARY ARTERY DISEASE INVOLVING NATIVE CORONARY ARTERY OF NATIVE HEART WITHOUT ANGINA PECTORIS: ICD-10-CM

## 2024-06-03 DIAGNOSIS — Z71.85 IMMUNIZATION COUNSELING: ICD-10-CM

## 2024-06-03 DIAGNOSIS — R73.03 PREDIABETES: ICD-10-CM

## 2024-06-03 PROCEDURE — 3077F SYST BP >= 140 MM HG: CPT | Performed by: INTERNAL MEDICINE

## 2024-06-03 PROCEDURE — 83036 HEMOGLOBIN GLYCOSYLATED A1C: CPT

## 2024-06-03 PROCEDURE — 1159F MED LIST DOCD IN RCRD: CPT | Performed by: INTERNAL MEDICINE

## 2024-06-03 PROCEDURE — 1160F RVW MEDS BY RX/DR IN RCRD: CPT | Performed by: INTERNAL MEDICINE

## 2024-06-03 PROCEDURE — 3008F BODY MASS INDEX DOCD: CPT | Performed by: INTERNAL MEDICINE

## 2024-06-03 PROCEDURE — 84153 ASSAY OF PSA TOTAL: CPT

## 2024-06-03 PROCEDURE — 1123F ACP DISCUSS/DSCN MKR DOCD: CPT | Performed by: INTERNAL MEDICINE

## 2024-06-03 PROCEDURE — 90471 IMMUNIZATION ADMIN: CPT | Performed by: INTERNAL MEDICINE

## 2024-06-03 PROCEDURE — 4010F ACE/ARB THERAPY RXD/TAKEN: CPT | Performed by: INTERNAL MEDICINE

## 2024-06-03 PROCEDURE — 80048 BASIC METABOLIC PNL TOTAL CA: CPT

## 2024-06-03 PROCEDURE — 99214 OFFICE O/P EST MOD 30 MIN: CPT | Performed by: INTERNAL MEDICINE

## 2024-06-03 PROCEDURE — 84443 ASSAY THYROID STIM HORMONE: CPT

## 2024-06-03 PROCEDURE — 36415 COLL VENOUS BLD VENIPUNCTURE: CPT

## 2024-06-03 PROCEDURE — 3079F DIAST BP 80-89 MM HG: CPT | Performed by: INTERNAL MEDICINE

## 2024-06-03 PROCEDURE — 3044F HG A1C LEVEL LT 7.0%: CPT | Performed by: INTERNAL MEDICINE

## 2024-06-03 PROCEDURE — 90677 PCV20 VACCINE IM: CPT | Performed by: INTERNAL MEDICINE

## 2024-06-03 RX ORDER — LISINOPRIL 40 MG/1
40 TABLET ORAL NIGHTLY
Qty: 90 TABLET | Refills: 3 | Status: SHIPPED | OUTPATIENT
Start: 2024-06-03 | End: 2025-06-03

## 2024-06-03 RX ORDER — METOPROLOL TARTRATE 25 MG/1
25 TABLET, FILM COATED ORAL 2 TIMES DAILY
Qty: 60 TABLET | Refills: 11 | Status: SHIPPED | OUTPATIENT
Start: 2024-06-03

## 2024-06-03 NOTE — PROGRESS NOTES
"Subjective   Patient ID: Sal Kruger is a 63 y.o. male who presents for Follow-up (Pt is here due to a 6 month follow up).    Here for [id number] has been a challenging time-his wife was hospitalized for a bleeding intracranial aneurysm-she is doing better now 5 months out with a bit better memory         Review of Systems    Objective   /80 (BP Location: Left arm, Patient Position: Sitting, BP Cuff Size: Adult)   Pulse 67   Temp 36.7 °C (98 °F) (Temporal)   Resp 16   Ht 1.778 m (5' 10\")   Wt 109 kg (240 lb 8.4 oz)   SpO2 97%   BMI 34.51 kg/m²     Physical Exam  Vitals reviewed.   Constitutional:       Appearance: Normal appearance.   HENT:      Head: Normocephalic and atraumatic.   Eyes:      General: No scleral icterus.        Right eye: No discharge.         Left eye: No discharge.      Extraocular Movements: Extraocular movements intact.      Conjunctiva/sclera: Conjunctivae normal.      Pupils: Pupils are equal, round, and reactive to light.   Cardiovascular:      Rate and Rhythm: Normal rate and regular rhythm.      Pulses: Normal pulses.      Heart sounds: Murmur heard.   Pulmonary:      Effort: Pulmonary effort is normal.      Breath sounds: Normal breath sounds. No wheezing or rhonchi.   Musculoskeletal:         General: No deformity or signs of injury. Normal range of motion.      Cervical back: Normal range of motion and neck supple. No rigidity or tenderness.   Lymphadenopathy:      Cervical: No cervical adenopathy.   Skin:     General: Skin is warm and dry.      Findings: No rash.   Neurological:      General: No focal deficit present.      Mental Status: He is alert and oriented to person, place, and time. Mental status is at baseline.      Cranial Nerves: No cranial nerve deficit.      Sensory: No sensory deficit.      Gait: Gait normal.   Psychiatric:         Mood and Affect: Mood normal.         Behavior: Behavior normal.         Thought Content: Thought content normal.       "   Judgment: Judgment normal.         Assessment/Plan   Problem List Items Addressed This Visit             ICD-10-CM    Agatston coronary artery calcium score greater than 400 - Primary R93.1    Relevant Medications    metoprolol tartrate (Lopressor) 25 mg tablet    CAD (coronary artery disease), native coronary artery I25.10    Relevant Medications    metoprolol tartrate (Lopressor) 25 mg tablet    Essential hypertension with goal blood pressure less than 130/85 I10    Relevant Medications    lisinopril 40 mg tablet    Dyslipidemia, goal LDL below 70 E78.5    Relevant Orders    Lipid Panel    TSH with reflex to Free T4 if abnormal    Alanine Aminotransferase    Prediabetes R73.03    Relevant Orders    Hemoglobin A1C    Basic Metabolic Panel    S/P CABG (coronary artery bypass graft) Z95.1    Wears glasses Z97.3    Type 2 diabetes mellitus without complication, without long-term current use of insulin (Multi) E11.9    Relevant Orders    Hemoglobin A1C     Other Visit Diagnoses         Codes    Immunization counseling     Z71.85    Relevant Orders    Pneumococcal conjugate vaccine, 20-valent (PREVNAR 20)    Essential hypertension     I10    Relevant Medications    lisinopril 40 mg tablet    Atherosclerotic heart disease of native coronary artery without angina pectoris     I25.10    Relevant Medications    metoprolol tartrate (Lopressor) 25 mg tablet             Portions of this encounter note have been copied from my previous note dated 11/13/23  , which have been updated where appropriate and all reflect my current medical decision making from today.     Living situation-he and his wife downsized to a condo in 2018.    Caregiving concerns-his wife suffered a bleeding intracranial aneurysm near [place] 2023.  She has improved but will not be able to drive in the future.               6/24-fortunately 5 months out now her short-term memory has improved a bit      CAD s/p 3vCABG Dec '19 - 4 cardiac stents placed May  2019 followed by 2 more stents after his cardiac arrest late June 2019 per Dr. Li clinically doing very well. Now back at work. He states that elected not to pursue cardiac rehabilitation.         He will see Dr. Li in cardiology annually. Medications reviewed. He remains quite compliant. Appt 2/29/24      Status post V. fib cardiac arrest 6/27/19- amazingly he has done well following urgent catheterization. He saw Dr. Enriquez at that time but no longer follows up with him.     Hypertension/dyslipidemia- he will continue his meds. Goal LDL <70;   LDL = 35 Oct '22           11/23-LDL 38, BMI 35.  Blood pressure borderline elevated.  His home blood pressure machine has been checked and it correlates.  He will check this several times weekly and send an average in understanding that if the average is consistently over 130 he will need additional medication            6/24-blood pressure borderline-he will advance metoprolol.  Encouraged him to check his blood pressure after lunch or after dinner and call if the average is not consistently under 130     Diabetes/BMI over 30- we spoke at length in the past regarding ways to optimize his coverage. Home glucose values appear good so far and somewhat improved. Tolerating metformin           May '23; A1c slightly increased towards 6.4%.  Considering his BMI, discussed semaglutide.  He will meet with our pharmacy team to see if this might be an option             11/23-A1c 6.3%.  BMI 35.  He will continue his medication option and will reassess labs in 4 months.  Weight loss remains a primary goal he understands              6/24-BMI 34.5 Labs to be done this [id number] 6.9%.  In light of his challenging year now caring for his wife, this understandably has worsened.  He will optimize this and will reassess before follow-up    exercise routine-though he works full-time as an  is not exercising regularly. Suggested recumbent cycle and efforts  to lose weight in the past. Cardiac rehabilitation occurred after his cardiac arrest but not after his CABG    Exercising less w/ caregiver responsibilities. He'll advance this as he can     Costochondritis/chest pain syndrome-since his heart bypass he is occasionally gets central substernal pain with position changes. Discussed rib anatomy. He has reviewed this with Dr. Li. This is clearly different from his angina which he has not had. He understands if he does experience any of the angina symptoms that he had before his cardiac arrest-he needs to call 911.       Sleep apnea/history of pulmonary nodule- Negative pulm eval. RE nodule per Dr. Mooer. He's tolerated the CPAP for some 15 yrs. He understands pulmonologist f/u is necessary for machine. encouraged him to do. He will see Dr. Mccall in March. He just got a new CPAP machine in 2020- doing better               He remains compliant w/ this     History of fatty liver/elevated LFTs- evaluated by Dr. Weber in the liver clinic in the past including a biopsy 8/16 -negative. Recent LFTs normal     Sciatica-right side-encourage Ana exercises optimizing sleep in sitting position and call if not improved understanding physical therapy would be in order. Right-sided sciatica continued-when he has done this Ana exercises this helps. Encouraged optimal position and posture particularly while sitting.              11/23-left sciatica an issue since driving home from [place] in a smaller rental car-a [place] earlier this fall.  He will optimize his position try to sleep in his bed on his back with his knees elevated Ana exercises encouraged and follow-up in PT if not improved    Ganglion-right wrist-he is left-handed-he will follow this and notify me if this enlarges or changes or becomes painful     Bilateral carpal tunnel - both improved w/ splints nightly. he'll f/u w/ Dr. Mata w/ concerns. using splints nightly, with ongoing symptoms, right  hand a bit worse than his left, though he is left-handed.              Occas noted now. Using splints nightly. He'll return to Dr. Mata when splints no longer working       Advanced Right knee DJD-  He will continue to see Dr. Rice for injections in his right knee which so far have helped. He also received a knee brace for greater stability. Right knee injected              Presently right knee doing OK     Left elbow pain - improved w/ splint              No present concerns     Bilateral inguinal hernias/ umbilical hernia-caution with lifting. We'll follow. Umbilical hernia not problematic     Adenomatous colon polyps-  Colonoscopy completed 2021 but unfortunately the prep was bad and the procedure was aborted so he was told to come back in a year-2022            Colonoscopy updated early May '23. Poor prep, and 1 adenomatous polyp. Next in 4-5 months w/ 2 day prep            10/23-colonoscopy completed-multiple polyps-next colonoscopy in 1 year-10/24    Diverticulosis-severe on 10/23 colonoscopy.  He is now on MiraLAX per GI     BPH- annual PSA continues for prostate cancer screening. Nocturia not a present concern. PSA normal                  Vitamin D deficiency-he will advance this with his 2020 vitamin D on the low side still.        Dentures-he no longer follows in the dental clinic     Bifocal/Glasses/vision care- patient will continue routine vision exams now annually with diabetes. New glasses early . His exams continue annually he [id number]-[id number]-date     History of Basal cell cheek lesion excised by Mohs- he will continue visits in dermatology-- now as needed. He understands importance of sunscreen accordingly. He has some skin tags to remove, he'll see derm w/ concerns. encouraged sun screen     Nose lesion- Mohs surgery to address lesions. He will follow up as needed. No concerns presently.      Family concerns/bereavement-his younger brother  May 2023 after  fall and head injury.  He was 60 years old, with MS.  He has another brother who is in [place] who he visited summer 2023-he is not doing that well he notes              6/24 -     Planning on [facility] at age 64 in Aug '24    Covid series completed. Covid Booster completed. Additional booster shot discussed.     Prevnar 20 -updated 6/3/2024-today     Flu shot each fall-updated 11/13/2023      Follow-up as scheduled every 4-5 months to review labs, sooner as needed     Charting was completed using voice recognition technology and may include unintended errors.

## 2024-06-04 LAB
ANION GAP SERPL CALC-SCNC: 15 MMOL/L (ref 10–20)
BUN SERPL-MCNC: 19 MG/DL (ref 6–23)
CALCIUM SERPL-MCNC: 10.3 MG/DL (ref 8.6–10.6)
CHLORIDE SERPL-SCNC: 104 MMOL/L (ref 98–107)
CO2 SERPL-SCNC: 26 MMOL/L (ref 21–32)
CREAT SERPL-MCNC: 0.93 MG/DL (ref 0.5–1.3)
EGFRCR SERPLBLD CKD-EPI 2021: >90 ML/MIN/1.73M*2
EST. AVERAGE GLUCOSE BLD GHB EST-MCNC: 151 MG/DL
GLUCOSE SERPL-MCNC: 81 MG/DL (ref 74–99)
HBA1C MFR BLD: 6.9 %
POTASSIUM SERPL-SCNC: 4.4 MMOL/L (ref 3.5–5.3)
PSA SERPL-MCNC: 1.03 NG/ML
SODIUM SERPL-SCNC: 141 MMOL/L (ref 136–145)
TSH SERPL-ACNC: 1.55 MIU/L (ref 0.44–3.98)

## 2024-07-16 DIAGNOSIS — E11.9 TYPE 2 DIABETES MELLITUS WITHOUT COMPLICATION, WITHOUT LONG-TERM CURRENT USE OF INSULIN (MULTI): ICD-10-CM

## 2024-07-16 RX ORDER — LANCETS
EACH MISCELLANEOUS
Qty: 100 EACH | Refills: 3 | Status: SHIPPED | OUTPATIENT
Start: 2024-07-16

## 2024-07-16 RX ORDER — CALCIUM CITRATE/VITAMIN D3 200MG-6.25
1 TABLET ORAL DAILY
Qty: 100 STRIP | Refills: 3 | Status: SHIPPED | OUTPATIENT
Start: 2024-07-16 | End: 2024-07-17

## 2024-07-17 RX ORDER — BLOOD-GLUCOSE METER
EACH MISCELLANEOUS
Qty: 100 STRIP | Refills: 3 | Status: SHIPPED | OUTPATIENT
Start: 2024-07-17

## 2024-07-17 NOTE — TELEPHONE ENCOUNTER
Patient has different meter then the one we have in chart. Please approve this test strip for correct meter.

## 2024-09-20 DIAGNOSIS — E11.9 TYPE 2 DIABETES MELLITUS WITHOUT COMPLICATION, WITHOUT LONG-TERM CURRENT USE OF INSULIN (MULTI): ICD-10-CM

## 2024-09-20 DIAGNOSIS — I10 ESSENTIAL HYPERTENSION: ICD-10-CM

## 2024-09-20 DIAGNOSIS — E78.5 DYSLIPIDEMIA, GOAL LDL BELOW 70: ICD-10-CM

## 2024-09-20 RX ORDER — LISINOPRIL 40 MG/1
40 TABLET ORAL DAILY
Qty: 90 TABLET | Refills: 3 | Status: SHIPPED | OUTPATIENT
Start: 2024-09-20

## 2024-09-20 RX ORDER — ATORVASTATIN CALCIUM 80 MG/1
80 TABLET, FILM COATED ORAL DAILY
Qty: 90 TABLET | Refills: 3 | Status: SHIPPED | OUTPATIENT
Start: 2024-09-20

## 2024-09-20 RX ORDER — METFORMIN HYDROCHLORIDE 1000 MG/1
1000 TABLET ORAL
Qty: 180 TABLET | Refills: 3 | Status: SHIPPED | OUTPATIENT
Start: 2024-09-20

## 2024-11-13 ENCOUNTER — APPOINTMENT (OUTPATIENT)
Dept: PRIMARY CARE | Facility: CLINIC | Age: 64
End: 2024-11-13
Payer: COMMERCIAL

## 2024-11-13 DIAGNOSIS — E66.812 CLASS 2 SEVERE OBESITY WITH SERIOUS COMORBIDITY AND BODY MASS INDEX (BMI) OF 36.0 TO 36.9 IN ADULT, UNSPECIFIED OBESITY TYPE: ICD-10-CM

## 2024-11-13 DIAGNOSIS — I25.10 CORONARY ARTERY DISEASE INVOLVING NATIVE CORONARY ARTERY OF NATIVE HEART WITHOUT ANGINA PECTORIS: ICD-10-CM

## 2024-11-13 DIAGNOSIS — R35.1 BENIGN PROSTATIC HYPERPLASIA WITH NOCTURIA: ICD-10-CM

## 2024-11-13 DIAGNOSIS — E11.9 TYPE 2 DIABETES MELLITUS WITHOUT COMPLICATION, WITHOUT LONG-TERM CURRENT USE OF INSULIN (MULTI): ICD-10-CM

## 2024-11-13 DIAGNOSIS — E66.01 CLASS 2 SEVERE OBESITY WITH SERIOUS COMORBIDITY AND BODY MASS INDEX (BMI) OF 36.0 TO 36.9 IN ADULT, UNSPECIFIED OBESITY TYPE: ICD-10-CM

## 2024-11-13 DIAGNOSIS — E78.5 DYSLIPIDEMIA, GOAL LDL BELOW 70: ICD-10-CM

## 2024-11-13 DIAGNOSIS — Z00.00 ANNUAL PHYSICAL EXAM: Primary | ICD-10-CM

## 2024-11-13 DIAGNOSIS — D12.6 TUBULAR ADENOMA OF COLON: ICD-10-CM

## 2024-11-13 DIAGNOSIS — N40.1 BENIGN PROSTATIC HYPERPLASIA WITH NOCTURIA: ICD-10-CM

## 2024-11-13 DIAGNOSIS — Z00.00 ROUTINE GENERAL MEDICAL EXAMINATION AT HEALTH CARE FACILITY: ICD-10-CM

## 2024-11-13 DIAGNOSIS — Z23 IMMUNIZATION DUE: ICD-10-CM

## 2024-11-13 DIAGNOSIS — Z23 FLU VACCINE NEED: ICD-10-CM

## 2024-11-13 PROCEDURE — 1160F RVW MEDS BY RX/DR IN RCRD: CPT | Performed by: INTERNAL MEDICINE

## 2024-11-13 PROCEDURE — 1159F MED LIST DOCD IN RCRD: CPT | Performed by: INTERNAL MEDICINE

## 2024-11-13 PROCEDURE — 1123F ACP DISCUSS/DSCN MKR DOCD: CPT | Performed by: INTERNAL MEDICINE

## 2024-11-13 PROCEDURE — 3074F SYST BP LT 130 MM HG: CPT | Performed by: INTERNAL MEDICINE

## 2024-11-13 PROCEDURE — 3078F DIAST BP <80 MM HG: CPT | Performed by: INTERNAL MEDICINE

## 2024-11-13 PROCEDURE — 3044F HG A1C LEVEL LT 7.0%: CPT | Performed by: INTERNAL MEDICINE

## 2024-11-13 PROCEDURE — 4010F ACE/ARB THERAPY RXD/TAKEN: CPT | Performed by: INTERNAL MEDICINE

## 2024-11-13 PROCEDURE — 3008F BODY MASS INDEX DOCD: CPT | Performed by: INTERNAL MEDICINE

## 2024-11-13 PROCEDURE — 99397 PER PM REEVAL EST PAT 65+ YR: CPT | Performed by: INTERNAL MEDICINE

## 2024-11-13 PROCEDURE — 90662 IIV NO PRSV INCREASED AG IM: CPT | Performed by: INTERNAL MEDICINE

## 2024-11-13 PROCEDURE — 90471 IMMUNIZATION ADMIN: CPT | Performed by: INTERNAL MEDICINE

## 2024-11-13 RX ORDER — LANCETS 33 GAUGE
EACH MISCELLANEOUS DAILY
COMMUNITY
Start: 2024-07-16

## 2024-11-13 ASSESSMENT — PATIENT HEALTH QUESTIONNAIRE - PHQ9
1. LITTLE INTEREST OR PLEASURE IN DOING THINGS: NOT AT ALL
2. FEELING DOWN, DEPRESSED OR HOPELESS: NOT AT ALL
SUM OF ALL RESPONSES TO PHQ9 QUESTIONS 1 AND 2: 0

## 2024-11-13 NOTE — PROGRESS NOTES
"Subjective   Reason for Visit: Sal Kruger is an 64 y.o. male here for a Medicare Wellness visit.     Past Medical, Surgical, and Family History reviewed and updated in chart.         Here for wellness visit it has been a challenging time as he retired in September and his wife had fallen earlier in the summer in July-she came out of rehab soon after he retired  She is doing better but it is required a lot of work        Patient Care Team:  Lake Sage MD as PCP - General  Lake Sage MD as PCP - [facility] PCP     Review of Systems    Objective   Vitals:  /80   Pulse 53   Temp 36.3 °C (97.4 °F) (Temporal)   Ht 1.751 m (5' 8.94\")   Wt 113 kg (249 lb 3.2 oz)   SpO2 97%   BMI 36.87 kg/m²       Physical Exam  Constitutional:       Appearance: Normal appearance.   HENT:      Head: Normocephalic and atraumatic.      Right Ear: Tympanic membrane normal.      Left Ear: Tympanic membrane normal.      Nose: Nose normal.   Eyes:      General: No scleral icterus.     Extraocular Movements: Extraocular movements intact.      Conjunctiva/sclera: Conjunctivae normal.      Pupils: Pupils are equal, round, and reactive to light.   Cardiovascular:      Rate and Rhythm: Normal rate and regular rhythm.      Pulses: Normal pulses.      Heart sounds: Normal heart sounds. No murmur heard.  Pulmonary:      Effort: Pulmonary effort is normal. No respiratory distress.      Breath sounds: Normal breath sounds. No stridor. No wheezing.   Abdominal:      General: Abdomen is flat. Bowel sounds are normal. There is no distension.      Palpations: Abdomen is soft. There is no mass.      Tenderness: There is no abdominal tenderness. There is no guarding.   Musculoskeletal:         General: No swelling, tenderness or deformity. Normal range of motion.      Cervical back: Normal range of motion and neck supple. No tenderness.   Lymphadenopathy:      Cervical: No cervical adenopathy.   Skin:     General: Skin is warm and dry. "      Findings: No lesion or rash.   Neurological:      General: No focal deficit present.      Mental Status: He is alert and oriented to person, place, and time.      Cranial Nerves: No cranial nerve deficit.      Motor: No weakness.   Psychiatric:         Mood and Affect: Mood normal.         Behavior: Behavior normal.         Thought Content: Thought content normal.         Judgment: Judgment normal.         Assessment & Plan  Flu vaccine need    Orders:    Flu vaccine, trivalent, preservative free, HIGH-DOSE, age 65y+ (Fluzone)    Class 2 severe obesity with serious comorbidity and body mass index (BMI) of 36.0 to 36.9 in adult, unspecified obesity type         Routine general medical examination at health care facility    Orders:    1 Year Follow Up In [facility] - PCP - Wellness Exam; Future    Immunization due    Orders:    respiratory syncytial virus, RSV, vaccine, adjuvanted, age 60y+ (Arexvy) 120 mcg/0.5 mL suspension for reconstitution; Inject 0.5 mL into the muscle 1 time for 1 dose.    Annual physical exam         Tubular adenoma of colon    Orders:    Colonoscopy Diagnostic; Future    Benign prostatic hyperplasia with nocturia    Orders:    Prostate Specific Antigen; Future    Coronary artery disease involving native coronary artery of native heart without angina pectoris         Dyslipidemia, goal LDL below 70         Type 2 diabetes mellitus without complication, without long-term current use of insulin (Multi)    Orders:    Hemoglobin A1C; Future    Basic Metabolic Panel; Future            Portions of this encounter note have been copied from my previous note dated 6/3/24  , which have been updated where appropriate and all reflect my current medical decision making from today.     Labs from 6/4/2024 reviewed    Living situation-he and his wife downsized to a condo in 2018. 1 level living, laundry on 1st floor     Caregiving concerns-his wife suffered a bleeding intracranial aneurysm near  [place] 2023.  She has improved but will not be able to drive in the future.               6/24-fortunately 5 months out now her short-term memory has improved a bit              11/24-unfortunately his wife fell in 7/24, with a hip fracture,-and was readmitted to rehab for 8 weeks, discharged back to home early September 2024, just as he retired 9/3/2024.  She required one-on-one care for weeks.  Fortunately she is improving, now using a walker.  With baby monitors he is able to do more and more    USP-9/3/2024-as above, caregiving concerns have consumed his snf efforts so far.  Fortunately his wife is improved      CAD s/p 3vCABG Dec '19 - 4 cardiac stents placed May 2019 followed by 2 more stents after his cardiac arrest late June 2019 per Dr. Li clinically doing very well. Now back at work. He states that elected not to pursue cardiac rehabilitation.         He will see Dr. Li in cardiology annually. Medications reviewed. He remains quite compliant. Appt 2/29/24      Status post V. fib cardiac arrest 6/27/19- amazingly he has done well following urgent catheterization. He saw Dr. Enriquez at that time but no longer follows up with him.     Hypertension/dyslipidemia- he will continue his meds. Goal LDL <70;   LDL = 35 Oct '22           11/23-LDL 38, BMI 35.  Blood pressure borderline elevated.  His home blood pressure machine has been checked and it correlates.  He will check this several times weekly and send an average in understanding that if the average is consistently over 130 he will need additional medication            6/24-blood pressure borderline-he will advance metoprolol.  Encouraged him to check his blood pressure after lunch or after dinner and call if the average is not consistently under 130              11/24-blood pressure improved on recheck     Diabetes/BMI over 30- we spoke at length in the past regarding ways to optimize his coverage. Home glucose values appear  good so far and somewhat improved. Tolerating metformin           May '23; A1c slightly increased towards 6.4%.  Considering his BMI, discussed semaglutide.  He will meet with our pharmacy team to see if this might be an option             11/23-A1c 6.3%.  BMI 35.  He will continue his medication option and will reassess labs in 4 months.  Weight loss remains a primary goal he understands              6/24-BMI 34.5 Labs to be done this [id number]-[id number] 6.9%.  In light of his challenging year now caring for his wife, this understandably has worsened.  He will optimize this and will reassess before follow-up            11/24-with snf and caring for his wife, his weight is up 9 pounds.  He will continue labs and recheck A1c before follow-up    exercise routine-though he works full-time as an  is not exercising regularly. Suggested recumbent cycle and efforts to lose weight in the past. Cardiac rehabilitation occurred after his cardiac arrest but not after his CABG               Exercising less w/ caregiver responsibilities. He'll advance this as he can     Costochondritis/chest pain syndrome-since his heart bypass he is occasionally gets central substernal pain with position changes. Discussed rib anatomy. He has reviewed this with Dr. Li. This is clearly different from his angina which he has not had. He understands if he does experience any of the angina symptoms that he had before his cardiac arrest-he needs to call 911.              No recent concerns       Sleep apnea/history of pulmonary nodule- Negative pulm eval. RE nodule per Dr. Moore. He's tolerated the CPAP for some 15 yrs. He understands pulmonologist f/u is necessary for machine. encouraged him to do. He will see Dr. Mccall in March. He just got a new CPAP machine in 2020- doing better               He remains compliant w/ this     History of fatty liver/elevated LFTs- evaluated by Dr. Weber in the liver clinic in the  past including a biopsy 8/16 -negative. Recent LFTs normal.  Will follow     Sciatica-right side-encourage Ana exercises optimizing sleep in sitting position and call if not improved understanding physical therapy would be in order. Right-sided sciatica continued-when he has done this Ana exercises this helps. Encouraged optimal position and posture particularly while sitting.              11/23-left sciatica an issue since driving home from [place] in a smaller rental car-a [employer] earlier this fall.  He will optimize his position try to sleep in his bed on his back with his knees elevated Ana exercises encouraged and follow-up in PT if not improved               11/24-improved    Ganglion-right wrist-he is left-handed-he will follow this and notify me if this enlarges or changes or becomes painful     Bilateral carpal tunnel - both improved w/ splints nightly. he'll f/u w/ Dr. Mata w/ concerns. using splints nightly, with ongoing symptoms, right hand a bit worse than his left, though he is left-handed.              Occas noted now. Using splints nightly. He'll return to Dr. Mata when splints no longer working       Advanced Right knee DJD-  He will continue to see Dr. Rice for injections in his right knee which so far have helped. He also received a knee brace for greater stability. Right knee injected 9/22             Presently right knee doing OK     Left elbow pain - improved w/ splint              No present concerns     Bilateral inguinal hernias/ umbilical hernia-caution with lifting. We'll follow. Umbilical hernia not problematic     Adenomatous colon polyps-  Colonoscopy completed December 2021 but unfortunately the prep was bad and the procedure was aborted so he was told to come back in a year-December 2022            Colonoscopy updated early May '23. Poor prep, and 1 adenomatous polyp. Next in 4-5 months w/ 2 day prep            10/23-colonoscopy completed-multiple polyps-next  colonoscopy in 1 year-10/24             11/24-colonoscopy ordered    Diverticulosis-severe on 10/23 colonoscopy.  He is now on MiraLAX per GI     BPH- annual PSA continues for prostate cancer screening. Nocturia not a present concern. PSA normal                  Vitamin D deficiency-he will advance this with his 2020 vitamin D on the low side still.        Dentures-he no longer follows in the dental clinic     Bifocal/Glasses/vision care- patient will continue routine vision exams now annually with diabetes. New glasses early . His exams continue annually he hfhnyt-pi-ce-date     History of Basal cell cheek lesion excised by Mohs- he will continue visits in dermatology-- now as needed. He understands importance of sunscreen accordingly. He has some skin tags to remove, he'll see derm w/ concerns. encouraged sun screen     Nose lesion- Mohs surgery to address lesions. He will follow up as needed. No concerns presently.      Family concerns/bereavement-his younger brother  May 2023 after fall and head injury.  He was 60 years old, with MS.  He has another brother who is in [place] who he visited summer -he is not doing that well he notes               -     senior care-he was planning on [facility] at age 64 in Aug '24            He retired 9/3/2024 to help take care of his wife    Flu shot each fall-updated 2024 - today    Covid series completed. Covid Booster completed. Additional booster shot discussed / encouraged    RSV vacc suggested     Prevnar 20 -updated 6/3/2024       Follow-up as scheduled every 4-5 months to review labs, sooner as needed     Charting was completed using voice recognition technology and may include unintended errors.

## 2024-11-16 VITALS
HEART RATE: 53 BPM | DIASTOLIC BLOOD PRESSURE: 80 MMHG | OXYGEN SATURATION: 97 % | SYSTOLIC BLOOD PRESSURE: 128 MMHG | TEMPERATURE: 97.4 F | BODY MASS INDEX: 36.91 KG/M2 | HEIGHT: 69 IN | WEIGHT: 249.2 LBS

## 2024-11-19 ENCOUNTER — LAB (OUTPATIENT)
Dept: LAB | Facility: LAB | Age: 64
End: 2024-11-19
Payer: COMMERCIAL

## 2024-11-19 DIAGNOSIS — E11.9 TYPE 2 DIABETES MELLITUS WITHOUT COMPLICATION, WITHOUT LONG-TERM CURRENT USE OF INSULIN (MULTI): ICD-10-CM

## 2024-11-19 DIAGNOSIS — R73.03 PREDIABETES: ICD-10-CM

## 2024-11-19 DIAGNOSIS — E78.5 DYSLIPIDEMIA, GOAL LDL BELOW 70: ICD-10-CM

## 2024-11-19 LAB
ALT SERPL W P-5'-P-CCNC: 56 U/L (ref 10–52)
ANION GAP SERPL CALC-SCNC: 16 MMOL/L (ref 10–20)
BUN SERPL-MCNC: 21 MG/DL (ref 6–23)
CALCIUM SERPL-MCNC: 9.9 MG/DL (ref 8.6–10.6)
CHLORIDE SERPL-SCNC: 101 MMOL/L (ref 98–107)
CHOLEST SERPL-MCNC: 123 MG/DL (ref 0–199)
CHOLESTEROL/HDL RATIO: 3.5
CO2 SERPL-SCNC: 28 MMOL/L (ref 21–32)
CREAT SERPL-MCNC: 1.04 MG/DL (ref 0.5–1.3)
EGFRCR SERPLBLD CKD-EPI 2021: 79 ML/MIN/1.73M*2
EST. AVERAGE GLUCOSE BLD GHB EST-MCNC: 160 MG/DL
GLUCOSE SERPL-MCNC: 136 MG/DL (ref 74–99)
HBA1C MFR BLD: 7.2 %
HDLC SERPL-MCNC: 35.4 MG/DL
LDLC SERPL CALC-MCNC: 25 MG/DL
NON HDL CHOLESTEROL: 88 MG/DL (ref 0–149)
POTASSIUM SERPL-SCNC: 4.8 MMOL/L (ref 3.5–5.3)
SODIUM SERPL-SCNC: 140 MMOL/L (ref 136–145)
TRIGL SERPL-MCNC: 314 MG/DL (ref 0–149)
TSH SERPL-ACNC: 2.19 MIU/L (ref 0.44–3.98)
VLDL: 63 MG/DL (ref 0–40)

## 2024-11-19 PROCEDURE — 84460 ALANINE AMINO (ALT) (SGPT): CPT

## 2024-11-19 PROCEDURE — 84443 ASSAY THYROID STIM HORMONE: CPT

## 2024-11-19 PROCEDURE — 80048 BASIC METABOLIC PNL TOTAL CA: CPT

## 2024-11-19 PROCEDURE — 36415 COLL VENOUS BLD VENIPUNCTURE: CPT

## 2024-11-19 PROCEDURE — 83036 HEMOGLOBIN GLYCOSYLATED A1C: CPT

## 2024-11-19 PROCEDURE — 80061 LIPID PANEL: CPT

## 2024-11-21 DIAGNOSIS — E66.01 CLASS 2 SEVERE OBESITY WITH SERIOUS COMORBIDITY AND BODY MASS INDEX (BMI) OF 36.0 TO 36.9 IN ADULT, UNSPECIFIED OBESITY TYPE: ICD-10-CM

## 2024-11-21 DIAGNOSIS — E66.812 CLASS 2 SEVERE OBESITY WITH SERIOUS COMORBIDITY AND BODY MASS INDEX (BMI) OF 36.0 TO 36.9 IN ADULT, UNSPECIFIED OBESITY TYPE: ICD-10-CM

## 2024-11-21 DIAGNOSIS — R74.01 ELEVATED ALT MEASUREMENT: Primary | ICD-10-CM

## 2024-11-21 DIAGNOSIS — E11.9 TYPE 2 DIABETES MELLITUS WITHOUT COMPLICATION, WITHOUT LONG-TERM CURRENT USE OF INSULIN (MULTI): ICD-10-CM

## 2024-11-21 NOTE — RESULT ENCOUNTER NOTE
Tom    Thanks for coming in for your recent appointment, and for doing the fasting labs.  I am glad that the cholesterol panel shows that the LDL/bad cholesterol is very low.  This is important with respect to preventing further coronary artery events.  However the triglyceride level is slightly elevated.  Please continue to work towards reducing saturated fat, and calories towards a goal of weight loss.  All of these will help the triglyceride level.    The liver lab, called ALT is slightly elevated.  Please make a note to recheck nonfasting labs in about 4 weeks at the lab.  Paperwork is not needed as the orders are in the computer.  Please refrain from using alcohol 2 weeks before, as alcohol use can elevate the liver labs.  I will send you those results when available.    The A1c, and the fasting glucose suggest that the diabetes has worsened a bit.  I understand your schedule has been very difficult since September.  Nevertheless towards a goal of improving all of this, I have asked my pharmacy team here in my office, to call you to set up an appointment to review your diabetic home management plan to see how things can be optimized.  Let me know if you are not contacted by them over the next 2 weeks or so.    Once again thanks for doing the labs.    Wishing you and your family the very best as the holidays approach.    Sincerely,  Lake Sage MD

## 2024-11-26 DIAGNOSIS — Z12.11 SCREENING FOR COLON CANCER: ICD-10-CM

## 2024-11-26 RX ORDER — POLYETHYLENE GLYCOL 3350, SODIUM CHLORIDE, SODIUM BICARBONATE, POTASSIUM CHLORIDE 420; 11.2; 5.72; 1.48 G/4L; G/4L; G/4L; G/4L
4000 POWDER, FOR SOLUTION ORAL ONCE
COMMUNITY
End: 2024-11-26 | Stop reason: SDUPTHER

## 2024-12-02 RX ORDER — POLYETHYLENE GLYCOL 3350, SODIUM SULFATE ANHYDROUS, SODIUM BICARBONATE, SODIUM CHLORIDE, POTASSIUM CHLORIDE 236; 22.74; 6.74; 5.86; 2.97 G/4L; G/4L; G/4L; G/4L; G/4L
240 POWDER, FOR SOLUTION ORAL ONCE
Qty: 240 ML | Refills: 0 | Status: SHIPPED | OUTPATIENT
Start: 2024-12-02 | End: 2024-12-02

## 2024-12-03 ENCOUNTER — APPOINTMENT (OUTPATIENT)
Dept: PHARMACY | Facility: HOSPITAL | Age: 64
End: 2024-12-03
Payer: COMMERCIAL

## 2024-12-03 DIAGNOSIS — E66.01 CLASS 2 SEVERE OBESITY WITH SERIOUS COMORBIDITY AND BODY MASS INDEX (BMI) OF 36.0 TO 36.9 IN ADULT, UNSPECIFIED OBESITY TYPE: ICD-10-CM

## 2024-12-03 DIAGNOSIS — E66.812 CLASS 2 SEVERE OBESITY WITH SERIOUS COMORBIDITY AND BODY MASS INDEX (BMI) OF 36.0 TO 36.9 IN ADULT, UNSPECIFIED OBESITY TYPE: ICD-10-CM

## 2024-12-03 DIAGNOSIS — E11.9 TYPE 2 DIABETES MELLITUS WITHOUT COMPLICATION, WITHOUT LONG-TERM CURRENT USE OF INSULIN (MULTI): ICD-10-CM

## 2024-12-03 NOTE — PROGRESS NOTES
Clinical Pharmacy Appointment    Patient ID: Sal Kruger is a 64 y.o. male who presents for Diabetes.    Pt is here for First appointment.     Referring Provider: Lake Sage MD  PCP: Lake Sage MD   Last visit with PCP: 11/13/2024   Next visit with PCP: 05/29/2025    Subjective     Interval History  Patient was originally referred to the pharmacy team in May 2023 to see if GLP-1 agonists were an option for blood sugars and weight loss however prior authorization was denied for the Trulicity   Most recent A1c on 11/19/24 increased up further to 7.2%  Patient was referred to the pharmacy team for assistance with medication management of his diabetes and obesity     HPI  DIABETES MELLITUS TYPE 2:    Diagnosed with diabetes: ~4-5 years per patient. Known diabetic complications: CAD, obesity.  Does patient follow with Endocrinology: No  Last optometry exam: November 2024  Most recent visit in Podiatry: does not follow with podiatry -- patient denies sores or cuts on feet today      Current diabetic medications include:  Glimepiride 2 mg once daily at dinner  Metformin 1,000 mg twice daily     Historical diabetic medications include:   None     Adverse Effects: none reported today     Glucose Readings:  Glucometer/CGM Type: OneTouch Verio Glucometer   Patient tests BG a couple times per week typically fasting in the morning     Current home BG readings: most recently has been into the 120s-130s    Any episodes of hypoglycemia? No, denies signs/symptoms of hypoglycemia .   Does pt have proteinuria? Unknown, no completed lab for UACR in chart     Lifestyle:  Diet: 3 meals/day.   Breakfast: eggs, turkey sausage, sometimes toast or an english muffin and black coffee   Lunch: leftover from the night before, chicken salad, tuna salad   Dinner: all meals are cooked at home, tries to get a vegetable in with meals   Snacks: does snack in the afternoon - piece of fruit, will have pretzels at night   Drinks: water,  Jhonde Zero   Physical Activity:   None currently   Has been taking care of his wife so hasn't been able to leave the house much  Now that she is better he wants to try working activity in     Secondary Prevention:  Statin? Yes, Atorvastatin 80 mg daily   ACE-I/ARB? Yes, Lisinopril 40 mg daily   Aspirin? Yes, 81 mg daily     Pertinent PMH Review:  PMH of Pancreatitis: No  PMH of Retinopathy: No  PMH of MTC/MEN 2: No    Immunizations:  Influenza? 11/13/24  COVID? 01/17/22 - has not decided if he will get the updated shot this year  Pneumonia? PCV20 06/03/24, PPSV23 05/12/17  Shingles? 06/01/20, 09/01/20  RSV: None - states Dr. Sage discussed with him about getting     Drug Interactions  No relevant drug interactions were noted.    Medication System Management  Adherence/Organization: does not typically forget his medications, does use a pill box to help with organization   Affordability/Accessibility: no issues reported today     Patient's preferred pharmacy:     [facility] [address] [place] [address]  Phone: [phone] Fax: [phone]      Objective   No Known Allergies  Social History     Social History Narrative    Not on file      Medication Reconciliation:  No changes made today     Medication Review  Current Outpatient Medications   Medication Instructions    acetaminophen 500 mg capsule Take by mouth.    aspirin 81 mg, 2 times daily    atorvastatin (LIPITOR) 80 mg, oral, Daily    blood sugar diagnostic (OneTouch Verio test strips) strip Test once daily    cholecalciferol (Vitamin D-3) 50 mcg (2,000 unit) capsule Take by mouth.    glimepiride (Amaryl) 2 mg tablet TAKE 1 (ONE) TABLET BY MOUTH EVERY DAY at dinner FOR DIABETES    ibuprofen 200 mg tablet Take by mouth.    lancets misc Once daily    lisinopril 40 mg, oral, Daily    metFORMIN (GLUCOPHAGE) 1,000 mg, oral, 2 times daily (morning and late afternoon)    metoprolol tartrate  "(LOPRESSOR) 25 mg, oral, 2 times daily    OneTouch Delica Plus Lancet 30 gauge misc Daily      Vitals  BP Readings from Last 2 Encounters:   11/16/24 128/80   06/03/24 140/80     BMI Readings from Last 1 Encounters:   11/13/24 36.87 kg/m²      Labs  A1C  Lab Results   Component Value Date    HGBA1C 7.2 (H) 11/19/2024    HGBA1C 6.9 (H) 06/03/2024    HGBA1C 6.3 (A) 09/21/2023     BMP  Lab Results   Component Value Date    CALCIUM 9.9 11/19/2024     11/19/2024    K 4.8 11/19/2024    CO2 28 11/19/2024     11/19/2024    BUN 21 11/19/2024    CREATININE 1.04 11/19/2024    EGFR 79 11/19/2024     LFTs  Lab Results   Component Value Date    ALT 56 (H) 11/19/2024    AST 29 09/21/2023    ALKPHOS 51 09/21/2023    BILITOT 0.5 09/21/2023     FLP  Lab Results   Component Value Date    TRIG 314 (H) 11/19/2024    CHOL 123 11/19/2024    LDLF 38 09/21/2023    LDLCALC 25 11/19/2024    HDL 35.4 11/19/2024     Urine Microalbumin  No results found for: \"MICROALBCREA\"    Weight Management  Wt Readings from Last 3 Encounters:   11/13/24 113 kg (249 lb 3.2 oz)   06/03/24 109 kg (240 lb 8.4 oz)   02/29/24 109 kg (240 lb)      Estimated body mass index is 36.87 kg/m² as calculated from the following:    Height as of 11/13/24: 1.751 m (5' 8.94\").    Weight as of 11/13/24: 113 kg (249 lb 3.2 oz).    Patient educated on common side effects and warnings:  Increased satiety is common  Stomach upset such as stomach cramping, nausea, constipation, etc which can be precipitated by eating fatty greasy foods and overeating  Discussed risks of GLP1ra including risk of pancreatitis, MTC and worsening of DR  Also discussed risks of gastroparesis and gastroparalysis as this is a common discussion point in the news - patient was informed that this is rare and they have no risk factors increasing their risk for developing these issues     Assessment/Plan   Problem List Items Addressed This Visit       Obesity     Patient's goal A1c is < 7%.  Is pt " at goal? No, most recent A1c was 7.2% on 11/19/24 which had increased from 6.9% on 06/03/24.   Patient's SMBGs are typically in the 120s-130s fasting.       Rationale for plan:   Patient's blood sugars have continued to increase despite being on dual oral therapy   He is a good candidate for Glp-1 therapy for help with glycemic control, weight loss, and cardiovascular protection.   We discussed Ozempic and Mounjaro and the differences between the two. Patient would prefer to be on the most effective for weight loss to help him lose weight to improve pain in his knees.   Insurance requires a prior authorization for GLP-1 agonists, this was submitted for patient after encounter today.     Medication Changes:  CONTINUE  Metformin 1,000 mg 1 tablet by mouth twice daily   Glimepiride 2 mg 1 tablet by mouth once daily at dinner     Future Considerations:  If PA approved start Mounjaro at 2.5 mg once weekly     Monitoring and Education:  Patient educated on common side effects and boxed warnings of Mounjaro as described above   Informed patient that the PA process typically takes 24-72 hours however can take up to 1 week  At this time patient is unsure if he will receive the updated COVID booster. He did mention Dr. Sage discussed the RSV shot with him. Informed patient that if he did decide to get the updated COVID shot he could receive both at the same time from his local pharmacy.     I will reach out to patient to inform him of the PA decision once a determination is made. He was encouraged to reach out with any questions and/or concerns.            Type 2 diabetes mellitus without complication, without long-term current use of insulin (Multi)     Pharmacy Follow-Up: To be determined pending PA determination    PCP Follow-Up: 05/29/2025    Continue all meds under the continuation of care with the referring provider and clinical pharmacy team.    Thank you,    Freedom Smith, PharmD   Clinical Pharmacist    Verbal  consent to manage patient's drug therapy was obtained from the patient. They were informed they may decline to participate or withdraw from participation in pharmacy services at any time.

## 2024-12-03 NOTE — ASSESSMENT & PLAN NOTE
Patient's goal A1c is < 7%.  Is pt at goal? No, most recent A1c was 7.2% on 11/19/24 which had increased from 6.9% on 06/03/24.   Patient's SMBGs are typically in the 120s-130s fasting.       Rationale for plan:   Patient's blood sugars have continued to increase despite being on dual oral therapy   He is a good candidate for Glp-1 therapy for help with glycemic control, weight loss, and cardiovascular protection.   We discussed Ozempic and Mounjaro and the differences between the two. Patient would prefer to be on the most effective for weight loss to help him lose weight to improve pain in his knees.   Insurance requires a prior authorization for GLP-1 agonists, this was submitted for patient after encounter today.     Medication Changes:  CONTINUE  Metformin 1,000 mg 1 tablet by mouth twice daily   Glimepiride 2 mg 1 tablet by mouth once daily at dinner     Future Considerations:  If PA approved start Mounjaro at 2.5 mg once weekly     Monitoring and Education:  Patient educated on common side effects and boxed warnings of Mounjaro as described above   Informed patient that the PA process typically takes 24-72 hours however can take up to 1 week  At this time patient is unsure if he will receive the updated COVID booster. He did mention Dr. Sage discussed the RSV shot with him. Informed patient that if he did decide to get the updated COVID shot he could receive both at the same time from his local pharmacy.     I will reach out to patient to inform him of the PA decision once a determination is made. He was encouraged to reach out with any questions and/or concerns.

## 2024-12-05 ENCOUNTER — HOSPITAL ENCOUNTER (OUTPATIENT)
Dept: RADIOLOGY | Facility: EXTERNAL LOCATION | Age: 64
Discharge: HOME | End: 2024-12-05

## 2024-12-05 ENCOUNTER — HOSPITAL ENCOUNTER (OUTPATIENT)
Dept: RADIOLOGY | Facility: CLINIC | Age: 64
Discharge: HOME | End: 2024-12-05
Payer: COMMERCIAL

## 2024-12-05 ENCOUNTER — OFFICE VISIT (OUTPATIENT)
Dept: ORTHOPEDIC SURGERY | Facility: CLINIC | Age: 64
End: 2024-12-05
Payer: COMMERCIAL

## 2024-12-05 DIAGNOSIS — M17.11 PRIMARY OSTEOARTHRITIS OF RIGHT KNEE: Primary | ICD-10-CM

## 2024-12-05 DIAGNOSIS — M17.11 PRIMARY OSTEOARTHRITIS OF RIGHT KNEE: ICD-10-CM

## 2024-12-05 PROCEDURE — 99214 OFFICE O/P EST MOD 30 MIN: CPT | Performed by: FAMILY MEDICINE

## 2024-12-05 PROCEDURE — 73564 X-RAY EXAM KNEE 4 OR MORE: CPT | Mod: RT

## 2024-12-05 PROCEDURE — 20611 DRAIN/INJ JOINT/BURSA W/US: CPT | Mod: RT | Performed by: FAMILY MEDICINE

## 2024-12-05 PROCEDURE — 3048F LDL-C <100 MG/DL: CPT | Performed by: FAMILY MEDICINE

## 2024-12-05 PROCEDURE — 1123F ACP DISCUSS/DSCN MKR DOCD: CPT | Performed by: FAMILY MEDICINE

## 2024-12-05 PROCEDURE — 2500000004 HC RX 250 GENERAL PHARMACY W/ HCPCS (ALT 636 FOR OP/ED): Performed by: FAMILY MEDICINE

## 2024-12-05 PROCEDURE — 3051F HG A1C>EQUAL 7.0%<8.0%: CPT | Performed by: FAMILY MEDICINE

## 2024-12-05 PROCEDURE — 4010F ACE/ARB THERAPY RXD/TAKEN: CPT | Performed by: FAMILY MEDICINE

## 2024-12-05 RX ORDER — LIDOCAINE HYDROCHLORIDE 10 MG/ML
4 INJECTION, SOLUTION INFILTRATION; PERINEURAL
Status: COMPLETED | OUTPATIENT
Start: 2024-12-05 | End: 2024-12-05

## 2024-12-05 RX ORDER — METHYLPREDNISOLONE ACETATE 40 MG/ML
80 INJECTION, SUSPENSION INTRA-ARTICULAR; INTRALESIONAL; INTRAMUSCULAR; SOFT TISSUE
Status: COMPLETED | OUTPATIENT
Start: 2024-12-05 | End: 2024-12-05

## 2024-12-05 RX ORDER — ROPIVACAINE HYDROCHLORIDE 5 MG/ML
4 INJECTION, SOLUTION EPIDURAL; INFILTRATION; PERINEURAL
Status: COMPLETED | OUTPATIENT
Start: 2024-12-05 | End: 2024-12-05

## 2024-12-05 NOTE — PROGRESS NOTES
Patient ID: Sal Kruger is a 64 y.o. male.    L Inj/Asp: R knee on 12/5/2024 8:05 AM  Indications: pain  Details: 22 G needle, ultrasound-guided superolateral approach  Medications: 80 mg methylPREDNISolone acetate 40 mg/mL; 4 mL lidocaine 10 mg/mL (1 %); 4 mL ropivacaine 5 mg/mL (0.5 %)    80 mg methylPREDNISolone acetate 40 mg/mL  NDC: 0488-3142-98  LOT: [id number]  EXP: 06/01/2025  Procedure, treatment alternatives, risks and benefits explained, specific risks discussed. Consent was given by the patient. Immediately prior to procedure a time out was called to verify the correct patient, procedure, equipment, support staff and site/side marked as required. Patient was prepped and draped in the usual sterile fashion.       Sports Medicine Office Note    Today's Date: 12/5/2024     HPI: Sal Kruger is a 64 y.o. recently retired  who presents today for follow-up of chronic right knee pain with DJD.    He has received multiple, serial cortisone injections for his chronic knee DJD. It gives him great relief for up 12 months the last several times. His most recent injection on 02/2020 gave him great relief for over 1 year. His pain has gradually returned over the past 2-3 weeks. He denies injury or trauma. Today, he has dull deep right knee pain and denies injury or trauma. He takes occasional ibuprofen which has been less effective recently. He denies other treatments. He continues to work with his brace. He has no new complaints. He is requesting repeat cortisone injection for analgesia. We agreed upon repeating the cortisone injection into his right knee which he tolerated well. He will take prescription acetaminophen. He can continue working in the knee brace. Activity modifications were reviewed. He understands that he will ultimately need knee replacement but we will try to hold this off as long as possible.      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 This led to 6 stents that eventually clotted off and he had triple bypass in December at [facility]. He has recovered very well.     1/12/22, chronic right knee DJD, repeat CSI    9/8/2022, chronic right knee DJD, repeat CSI    9/26/2022, chronic right knee pain with DJD, good benefit from CSI & wants to replace a orthopedic knee brace; custom made DJO climaflex OA lateral     3/16/2023, chronic right knee DJD, repeat CSI     10/5/2023, he returns for 7-month follow-up of chronic right knee pain with DJD.  He is requesting repeat cortisone injection for long-term analgesia.  The previous injection given on 3/16/2023 recently started to wear off over the past couple of weeks.  He denies interval injury or trauma.  We agreed upon repeating the cortisone injection into his right knee which he tolerated well. He will take prescription acetaminophen. He can continue with his knee brace. Activity modifications were reviewed. He understands that he will ultimately need knee replacement but we will try to hold this off as long as possible. He will followup in 3-6 months or sooner for recheck.     Today, 12/5/2024, he returns for follow-up of his chronic right knee pain with degenerative joint disease and requesting repeat cortisone injection for long-term analgesia.  He had adequate relief from the previous injection on 3/16/2023. He denies any new falls or trauma.  Has not had any swelling or overlying erythema.    He has no other complaints.     Physical Examination:     The RIGHT knee is without obvious signs of acute bony deformity, swelling, erythema, or ecchymosis. There is trace to grade 1 effusion. The patella is without tenderness. Apprehension is negative with medial and lateral glide. Patella crepitus and patella grind are positive. The medial joint line is mildly tender and without bony crepitus or step-off. The lateral joint line is mildly tender and without bony crepitus or step-off.  Flexion & extension are full and symmetrical. There is no instability with stress testing.     The LEFT knee has trace to grade 1 joint effusion. Patella crepitus and grind are positive. There is minimal tenderness to the medial and lateral joint lines. Flexion and extension are without mechanical blocking. There is no instability with stress testing.   Skin - no rashes, sores, or open lesions. Strength, sensory and vascular exams are otherwise normal. There is no clubbing, cyanosis or edema.  Gait is slightly antalgic and tandem.    Imaging:  Radiographs of the right knee were reviewed and revealed significant tricompartmental osteoarthritis medial and patellofemoral joint spaces greater than lateral joint space..  The studies were reviewed by me personally in the office today.    Procedure Note:  After consent was obtained, the RIGHT knee was prepped in a sterile fashion. Ultrasound guidance was used to help insure proper needle placement into the joint, decrease patient discomfort, and decrease collateral damage. The knee joint was visualized and Depo-Medrol 80 mg with lidocaine 4 mL & ropivacaine 4 mL were injected without any complications. Ultrasound images were saved on an internal file for later reference. The patient tolerated the procedure well and the area was cleaned and bandaged.    Procedure Note Attestation   Procedure performed by my sports medicine fellow.    I, Dr. Jaime Rice MD, supervised the entire procedure .      Problem List Items Addressed This Visit             ICD-10-CM    DJD (degenerative joint disease) of knee - Primary M17.9    Relevant Orders    XR knee right 4+ views    L Inj/Asp: R knee    Point of Care Ultrasound (Completed)     Assessment and Plan:     We reviewed the exam and x-ray findings and discussed the conservative and surgical treatment options. We agreed that since patient has been having prolonged relief with corticosteroid injections we could offer him another  injection today.  Injection completed and patient tolerated this well.  We will have him follow-up as needed.    Feliberto Willard, DO  [facility] Sports Medicine Fellow     **This note was dictated using Dragon speech recognition software and was not corrected for spelling or grammatical errors**.

## 2024-12-09 ENCOUNTER — TELEPHONE (OUTPATIENT)
Dept: GASTROENTEROLOGY | Facility: CLINIC | Age: 64
End: 2024-12-09
Payer: COMMERCIAL

## 2024-12-09 NOTE — TELEPHONE ENCOUNTER
Teresa from [facility] called in requesting a call back on this prescription.. [phone].  Thank you.

## 2024-12-11 ENCOUNTER — TELEPHONE (OUTPATIENT)
Dept: PHARMACY | Facility: HOSPITAL | Age: 64
End: 2024-12-11
Payer: COMMERCIAL

## 2024-12-11 DIAGNOSIS — E66.812 CLASS 2 SEVERE OBESITY WITH SERIOUS COMORBIDITY AND BODY MASS INDEX (BMI) OF 36.0 TO 36.9 IN ADULT, UNSPECIFIED OBESITY TYPE: ICD-10-CM

## 2024-12-11 DIAGNOSIS — E66.01 CLASS 2 SEVERE OBESITY WITH SERIOUS COMORBIDITY AND BODY MASS INDEX (BMI) OF 36.0 TO 36.9 IN ADULT, UNSPECIFIED OBESITY TYPE: ICD-10-CM

## 2024-12-11 DIAGNOSIS — E11.9 TYPE 2 DIABETES MELLITUS WITHOUT COMPLICATION, WITHOUT LONG-TERM CURRENT USE OF INSULIN (MULTI): Primary | ICD-10-CM

## 2024-12-11 RX ORDER — DAPAGLIFLOZIN 5 MG/1
5 TABLET, FILM COATED ORAL DAILY
Qty: 30 TABLET | Refills: 1 | Status: SHIPPED | OUTPATIENT
Start: 2024-12-11 | End: 2025-02-09

## 2024-12-11 NOTE — TELEPHONE ENCOUNTER
Prior Authorization Denial    Prior authorization was denied for Mounjaro due to patient not trying an SGLT2 inhibitor first.     Spoke with patient today to notify of denial. Patient's most recent A1c is above goal, he is agreeable to trying either Farxiga or Jardiance. Discussed that this could be in place of glimepiride since he is on a low dose of glimepiride.     Farxiga Education:  Counseled patient on Farxiga MOA, expectations, side effects, duration of therapy, administration, and monitoring parameters.  Reviewed the benefits of SGLT-2i therapy, such as glycemic control and kidney and CV protection.  Advised patient to practice proper  hygiene to reduce risk of UTIs or yeast infections.  Advised patient to maintain adequate fluid intake to remain hydrated while on SGLT2i therapy.  Also informed patient that Farxiga should be held at least 3 days prior to procedure/surgery. He has a colonoscopy scheduled for 01/09/25 so I instructed patient to take his last dose on Sunday 01/05/24 and hold until after the colonoscopy.   Answered all patient questions and concerns.    START:   Farxiga 5 mg 1 tablet by mouth once daily   STOP:   Glimepiride 2 mg once daily with dinner  CONTINUE:  Metformin 1,000 mg 1 tablet by mouth twice daily     Once patient is on Farxiga for ~3 months will attempt prior authorization for Mounjaro again. We will follow-up on 01/07/25 to see how patient tolerated the medication and to discuss increasing up the dose if needed. He was encouraged to reach out with any questions and/or concerns prior to then.     Thank you,     Freedom Smith, PharmD

## 2024-12-18 ENCOUNTER — LAB (OUTPATIENT)
Dept: LAB | Facility: LAB | Age: 64
End: 2024-12-18
Payer: COMMERCIAL

## 2024-12-18 DIAGNOSIS — R74.01 ELEVATED ALT MEASUREMENT: ICD-10-CM

## 2024-12-18 LAB
ALBUMIN SERPL BCP-MCNC: 4.7 G/DL (ref 3.4–5)
ALP SERPL-CCNC: 61 U/L (ref 33–136)
ALT SERPL W P-5'-P-CCNC: 60 U/L (ref 10–52)
AST SERPL W P-5'-P-CCNC: 35 U/L (ref 9–39)
BILIRUB DIRECT SERPL-MCNC: 0.1 MG/DL (ref 0–0.3)
BILIRUB SERPL-MCNC: 0.9 MG/DL (ref 0–1.2)
PROT SERPL-MCNC: 7.2 G/DL (ref 6.4–8.2)

## 2024-12-18 PROCEDURE — 80076 HEPATIC FUNCTION PANEL: CPT

## 2024-12-18 PROCEDURE — 36415 COLL VENOUS BLD VENIPUNCTURE: CPT

## 2024-12-28 DIAGNOSIS — R74.01 ALT (SGPT) LEVEL RAISED: ICD-10-CM

## 2024-12-28 DIAGNOSIS — K76.0 FATTY LIVER DISEASE, NONALCOHOLIC: ICD-10-CM

## 2024-12-28 DIAGNOSIS — K76.0 HEPATIC STEATOSIS: Primary | ICD-10-CM

## 2024-12-30 ENCOUNTER — ANESTHESIA EVENT (OUTPATIENT)
Dept: GASTROENTEROLOGY | Facility: EXTERNAL LOCATION | Age: 64
End: 2024-12-30

## 2025-01-03 NOTE — TELEPHONE ENCOUNTER
Patient states he never received prep, states he was told by pharmacy that we needed to clarify RX. I called dispensing pharmacy for RX clarification, they stated nothing needed to be clarified, pt just never picked up RX. Called patient to let him know, he is aware. I advised him to call me if there are any issues.

## 2025-01-06 ENCOUNTER — HOSPITAL ENCOUNTER (OUTPATIENT)
Dept: RADIOLOGY | Facility: CLINIC | Age: 65
Discharge: HOME | End: 2025-01-06
Payer: COMMERCIAL

## 2025-01-06 DIAGNOSIS — K76.0 HEPATIC STEATOSIS: ICD-10-CM

## 2025-01-06 DIAGNOSIS — K76.0 FATTY LIVER DISEASE, NONALCOHOLIC: ICD-10-CM

## 2025-01-06 DIAGNOSIS — R74.01 ALT (SGPT) LEVEL RAISED: ICD-10-CM

## 2025-01-06 PROCEDURE — 76705 ECHO EXAM OF ABDOMEN: CPT | Performed by: RADIOLOGY

## 2025-01-06 PROCEDURE — 76705 ECHO EXAM OF ABDOMEN: CPT

## 2025-01-07 ENCOUNTER — TELEMEDICINE (OUTPATIENT)
Dept: PHARMACY | Facility: HOSPITAL | Age: 65
End: 2025-01-07
Payer: COMMERCIAL

## 2025-01-07 ENCOUNTER — APPOINTMENT (OUTPATIENT)
Dept: GASTROENTEROLOGY | Facility: EXTERNAL LOCATION | Age: 65
End: 2025-01-07
Payer: COMMERCIAL

## 2025-01-07 DIAGNOSIS — E66.812 CLASS 2 SEVERE OBESITY WITH SERIOUS COMORBIDITY AND BODY MASS INDEX (BMI) OF 36.0 TO 36.9 IN ADULT, UNSPECIFIED OBESITY TYPE: ICD-10-CM

## 2025-01-07 DIAGNOSIS — E66.01 CLASS 2 SEVERE OBESITY WITH SERIOUS COMORBIDITY AND BODY MASS INDEX (BMI) OF 36.0 TO 36.9 IN ADULT, UNSPECIFIED OBESITY TYPE: ICD-10-CM

## 2025-01-07 DIAGNOSIS — E11.9 TYPE 2 DIABETES MELLITUS WITHOUT COMPLICATION, WITHOUT LONG-TERM CURRENT USE OF INSULIN (MULTI): ICD-10-CM

## 2025-01-07 NOTE — H&P (VIEW-ONLY)
Clinical Pharmacy Appointment    Patient ID: Sal Kruger is a 64 y.o. male who presents for Diabetes.    Pt is here for Follow Up appointment.     Referring Provider: Lake Sage MD  PCP: Lake Sage MD   Last visit with PCP: 11/13/2024   Next visit with PCP: 05/29/2025    Subjective     Patient was referred to the pharmacy team for assistance with medication management of his diabetes and obesity. On 11/19/24 patient's A1c increased up to 7.2%. Prior authorization for Mounjaro was denied as insurance prefers that he try either Farxiga or Jardiance first. Farxiga was slightly more affordable so he was started on this in place of Glimepiride. We are following-up today to see how he has tolerated it and discuss submitted PA for Mounjaro again since he has been taking the Farxiga.     HPI  DIABETES MELLITUS TYPE 2:    Diagnosed with diabetes: ~4-5 years per patient. Known diabetic complications: CAD, obesity.  Does patient follow with Endocrinology: No  Last optometry exam: November 2024  Most recent visit in Podiatry: does not follow with podiatry -- patient denies sores or cuts on feet today      Current diabetic medications include:  Farxiga 5 mg once daily   Metformin 1,000 mg twice daily     Historical diabetic medications include:   Glimepiride 2 mg (stopped when patient was started on Farxiga)    Adverse Effects: none reported today, patient tolerating Farxiga well     Glucose Readings:  Glucometer/CGM Type: OneTouch Verio Glucometer   Patient tests BG a couple times per week typically fasting in the morning     Current home BG readings:   Fasting typically in the  130s-150s    Previous home BG readings:   Had been in the 120s-130s fasting     Any episodes of hypoglycemia? No, denies signs/symptoms of hypoglycemia  Does pt have proteinuria? Unknown, no completed lab for UACR in chart     Lifestyle:  Diet: 3 meals/day.   Breakfast: eggs, turkey sausage, sometimes toast or an english muffin and black  coffee   Lunch: leftover from the night before, chicken salad, tuna salad   Dinner: all meals are cooked at home, tries to get a vegetable in with meals   Snacks: does snack in the afternoon - piece of fruit, will have pretzels at night   Drinks: water, Gatorade Zero   Physical Activity:   None currently   Has been taking care of his wife so hasn't been able to leave the house much  Now that she is better he wants to try working activity in     Secondary Prevention:  Statin? Yes, Atorvastatin 80 mg daily   ACE-I/ARB? Yes, Lisinopril 40 mg daily   Aspirin? Yes, 81 mg daily     Pertinent PMH Review:  PMH of Pancreatitis: No  PMH of Retinopathy: No  PMH of MTC/MEN 2: No    Immunizations:  Influenza? 11/13/24  COVID? 01/17/22 - has not decided if he will get the updated shot this year  Pneumonia? PCV20 06/03/24, PPSV23 05/12/17  Shingles? 06/01/20, 09/01/20  RSV: None - states Dr. Sage discussed with him about getting     Drug Interactions  No relevant drug interactions were noted.    Medication System Management  Adherence/Organization: does not typically forget his medications, does use a pill box to help with organization   Affordability/Accessibility: no issues reported today     Patient's preferred pharmacy:     [facility] [id number] [place] [id number]  Phone: [phone] Fax: [phone]      Objective   No Known Allergies  Social History     Social History Narrative    Not on file      Medication Reconciliation:  No changes made today     Medication Review  Current Outpatient Medications   Medication Instructions    acetaminophen 500 mg capsule Take by mouth.    aspirin 81 mg, 2 times daily    atorvastatin (LIPITOR) 80 mg, oral, Daily    blood sugar diagnostic (OneTouch Verio test strips) strip Test once daily    cholecalciferol (Vitamin D-3) 50 mcg (2,000 unit) capsule Take by mouth.    dapagliflozin propanediol (FARXIGA) 5 mg, oral,  "Daily    ibuprofen 200 mg tablet Take by mouth.    lancets misc Once daily    lisinopril 40 mg, oral, Daily    metFORMIN (GLUCOPHAGE) 1,000 mg, oral, 2 times daily (morning and late afternoon)    metoprolol tartrate (LOPRESSOR) 25 mg, oral, 2 times daily    OneTouch Delica Plus Lancet 30 gauge misc Daily      Vitals  BP Readings from Last 2 Encounters:   11/16/24 128/80   06/03/24 140/80     BMI Readings from Last 1 Encounters:   11/13/24 36.87 kg/m²      Labs  A1C  Lab Results   Component Value Date    HGBA1C 7.2 (H) 11/19/2024    HGBA1C 6.9 (H) 06/03/2024    HGBA1C 6.3 (A) 09/21/2023     BMP  Lab Results   Component Value Date    CALCIUM 9.9 11/19/2024     11/19/2024    K 4.8 11/19/2024    CO2 28 11/19/2024     11/19/2024    BUN 21 11/19/2024    CREATININE 1.04 11/19/2024    EGFR 79 11/19/2024     LFTs  Lab Results   Component Value Date    ALT 60 (H) 12/18/2024    AST 35 12/18/2024    ALKPHOS 61 12/18/2024    BILITOT 0.9 12/18/2024     FLP  Lab Results   Component Value Date    TRIG 314 (H) 11/19/2024    CHOL 123 11/19/2024    LDLF 38 09/21/2023    LDLCALC 25 11/19/2024    HDL 35.4 11/19/2024     Urine Microalbumin  No results found for: \"MICROALBCREA\"    Weight Management  Wt Readings from Last 3 Encounters:   11/13/24 113 kg (249 lb 3.2 oz)   06/03/24 109 kg (240 lb 8.4 oz)   02/29/24 109 kg (240 lb)      Estimated body mass index is 36.87 kg/m² as calculated from the following:    Height as of 11/13/24: 1.751 m (5' 8.94\").    Weight as of 11/13/24: 113 kg (249 lb 3.2 oz).      Assessment/Plan   Problem List Items Addressed This Visit       Obesity    Type 2 diabetes mellitus without complication, without long-term current use of insulin (Multi)     Patient's goal A1c is < 7%.  Is pt at goal? No, most recent A1c was 7.2% on 11/19/24 which had increased from 6.9% on 06/03/24.   Patient's SMBGs are now typically in the 130s-150s which has increased up from 120s-130s reported at last encounter.      "   Rationale for plan:   Patient's blood sugars have continued to increase despite addition of Farxiga 5 mg   He is a good candidate for Glp-1 therapy for help with glycemic control, weight loss, and cardiovascular protection.   Mounjaro was previously denied by insurance due to patient not trying an SGLT2 inhibitor.   Will resubmit PA for Mounjaro after encounter today showing trial of Farxiga and blood sugars continuing to increase    Medication Changes:  CONTINUE  Metformin 1,000 mg 1 tablet by mouth twice daily   Glimepiride 2 mg 1 tablet by mouth once daily at dinner   Farxiga 5 mg 1 tablet by mouth once daily     Future Considerations:  If PA approved start Mounjaro at 2.5 mg once weekly     Monitoring and Education:  Patient previously educated on common side effects and boxed warnings of Mounjaro at our first encounter as he was referred initially to start therapy  Informed patient that the PA process typically takes 24-72 hours however can take up to 1 week    I will reach out to patient to inform him of the PA decision once a determination is made. He was encouraged to reach out with any questions and/or concerns.           Pharmacy Follow-Up: To be determined pending PA determination   PCP Follow-Up: 05/29/20259    Continue all meds under the continuation of care with the referring provider and clinical pharmacy team.    Thank you,    Freedom Smith, PharmD   Clinical Pharmacist    Verbal consent to manage patient's drug therapy was obtained from the patient. They were informed they may decline to participate or withdraw from participation in pharmacy services at any time.

## 2025-01-07 NOTE — ASSESSMENT & PLAN NOTE
Patient's goal A1c is < 7%.  Is pt at goal? No, most recent A1c was 7.2% on 11/19/24 which had increased from 6.9% on 06/03/24.   Patient's SMBGs are now typically in the 130s-150s which has increased up from 120s-130s reported at last encounter.        Rationale for plan:   Patient's blood sugars have continued to increase despite addition of Farxiga 5 mg   He is a good candidate for Glp-1 therapy for help with glycemic control, weight loss, and cardiovascular protection.   Mounjaro was previously denied by insurance due to patient not trying an SGLT2 inhibitor.   Will resubmit PA for Mounjaro after encounter today showing trial of Farxiga and blood sugars continuing to increase    Medication Changes:  CONTINUE  Metformin 1,000 mg 1 tablet by mouth twice daily   Glimepiride 2 mg 1 tablet by mouth once daily at dinner   Farxiga 5 mg 1 tablet by mouth once daily     Future Considerations:  If PA approved start Mounjaro at 2.5 mg once weekly     Monitoring and Education:  Patient previously educated on common side effects and boxed warnings of Mounjaro at our first encounter as he was referred initially to start therapy  Informed patient that the PA process typically takes 24-72 hours however can take up to 1 week    I will reach out to patient to inform him of the PA decision once a determination is made. He was encouraged to reach out with any questions and/or concerns.

## 2025-01-07 NOTE — PROGRESS NOTES
Clinical Pharmacy Appointment    Patient ID: Sal Kruger is a 64 y.o. male who presents for Diabetes.    Pt is here for Follow Up appointment.     Referring Provider: Lake Sage MD  PCP: Lake Sage MD   Last visit with PCP: 11/13/2024   Next visit with PCP: 05/29/2025    Subjective     Patient was referred to the pharmacy team for assistance with medication management of his diabetes and obesity. On 11/19/24 patient's A1c increased up to 7.2%. Prior authorization for Mounjaro was denied as insurance prefers that he try either Farxiga or Jardiance first. Farxiga was slightly more affordable so he was started on this in place of Glimepiride. We are following-up today to see how he has tolerated it and discuss submitted PA for Mounjaro again since he has been taking the Farxiga.     HPI  DIABETES MELLITUS TYPE 2:    Diagnosed with diabetes: ~4-5 years per patient. Known diabetic complications: CAD, obesity.  Does patient follow with Endocrinology: No  Last optometry exam: November 2024  Most recent visit in Podiatry: does not follow with podiatry -- patient denies sores or cuts on feet today      Current diabetic medications include:  Farxiga 5 mg once daily   Metformin 1,000 mg twice daily     Historical diabetic medications include:   Glimepiride 2 mg (stopped when patient was started on Farxiga)    Adverse Effects: none reported today, patient tolerating Farxiga well     Glucose Readings:  Glucometer/CGM Type: OneTouch Verio Glucometer   Patient tests BG a couple times per week typically fasting in the morning     Current home BG readings:   Fasting typically in the  130s-150s    Previous home BG readings:   Had been in the 120s-130s fasting     Any episodes of hypoglycemia? No, denies signs/symptoms of hypoglycemia  Does pt have proteinuria? Unknown, no completed lab for UACR in chart     Lifestyle:  Diet: 3 meals/day.   Breakfast: eggs, turkey sausage, sometimes toast or an english muffin and black  coffee   Lunch: leftover from the night before, chicken salad, tuna salad   Dinner: all meals are cooked at home, tries to get a vegetable in with meals   Snacks: does snack in the afternoon - piece of fruit, will have pretzels at night   Drinks: water, Gatorade Zero   Physical Activity:   None currently   Has been taking care of his wife so hasn't been able to leave the house much  Now that she is better he wants to try working activity in     Secondary Prevention:  Statin? Yes, Atorvastatin 80 mg daily   ACE-I/ARB? Yes, Lisinopril 40 mg daily   Aspirin? Yes, 81 mg daily     Pertinent PMH Review:  PMH of Pancreatitis: No  PMH of Retinopathy: No  PMH of MTC/MEN 2: No    Immunizations:  Influenza? 11/13/24  COVID? 01/17/22 - has not decided if he will get the updated shot this year  Pneumonia? PCV20 06/03/24, PPSV23 05/12/17  Shingles? 06/01/20, 09/01/20  RSV: None - states Dr. Sage discussed with him about getting     Drug Interactions  No relevant drug interactions were noted.    Medication System Management  Adherence/Organization: does not typically forget his medications, does use a pill box to help with organization   Affordability/Accessibility: no issues reported today     Patient's preferred pharmacy:     [facility] [address] [place] [id number]  Phone: [phone] Fax: [phone]      Objective   No Known Allergies  Social History     Social History Narrative    Not on file      Medication Reconciliation:  No changes made today     Medication Review  Current Outpatient Medications   Medication Instructions    acetaminophen 500 mg capsule Take by mouth.    aspirin 81 mg, 2 times daily    atorvastatin (LIPITOR) 80 mg, oral, Daily    blood sugar diagnostic (OneTouch Verio test strips) strip Test once daily    cholecalciferol (Vitamin D-3) 50 mcg (2,000 unit) capsule Take by mouth.    dapagliflozin propanediol (FARXIGA) 5 mg, oral,  "Daily    ibuprofen 200 mg tablet Take by mouth.    lancets misc Once daily    lisinopril 40 mg, oral, Daily    metFORMIN (GLUCOPHAGE) 1,000 mg, oral, 2 times daily (morning and late afternoon)    metoprolol tartrate (LOPRESSOR) 25 mg, oral, 2 times daily    OneTouch Delica Plus Lancet 30 gauge misc Daily      Vitals  BP Readings from Last 2 Encounters:   11/16/24 128/80   06/03/24 140/80     BMI Readings from Last 1 Encounters:   11/13/24 36.87 kg/m²      Labs  A1C  Lab Results   Component Value Date    HGBA1C 7.2 (H) 11/19/2024    HGBA1C 6.9 (H) 06/03/2024    HGBA1C 6.3 (A) 09/21/2023     BMP  Lab Results   Component Value Date    CALCIUM 9.9 11/19/2024     11/19/2024    K 4.8 11/19/2024    CO2 28 11/19/2024     11/19/2024    BUN 21 11/19/2024    CREATININE 1.04 11/19/2024    EGFR 79 11/19/2024     LFTs  Lab Results   Component Value Date    ALT 60 (H) 12/18/2024    AST 35 12/18/2024    ALKPHOS 61 12/18/2024    BILITOT 0.9 12/18/2024     FLP  Lab Results   Component Value Date    TRIG 314 (H) 11/19/2024    CHOL 123 11/19/2024    LDLF 38 09/21/2023    LDLCALC 25 11/19/2024    HDL 35.4 11/19/2024     Urine Microalbumin  No results found for: \"MICROALBCREA\"    Weight Management  Wt Readings from Last 3 Encounters:   11/13/24 113 kg (249 lb 3.2 oz)   06/03/24 109 kg (240 lb 8.4 oz)   02/29/24 109 kg (240 lb)      Estimated body mass index is 36.87 kg/m² as calculated from the following:    Height as of 11/13/24: 1.751 m (5' 8.94\").    Weight as of 11/13/24: 113 kg (249 lb 3.2 oz).      Assessment/Plan   Problem List Items Addressed This Visit       Obesity    Type 2 diabetes mellitus without complication, without long-term current use of insulin (Multi)     Patient's goal A1c is < 7%.  Is pt at goal? No, most recent A1c was 7.2% on 11/19/24 which had increased from 6.9% on 06/03/24.   Patient's SMBGs are now typically in the 130s-150s which has increased up from 120s-130s reported at last encounter.      "   Rationale for plan:   Patient's blood sugars have continued to increase despite addition of Farxiga 5 mg   He is a good candidate for Glp-1 therapy for help with glycemic control, weight loss, and cardiovascular protection.   Mounjaro was previously denied by insurance due to patient not trying an SGLT2 inhibitor.   Will resubmit PA for Mounjaro after encounter today showing trial of Farxiga and blood sugars continuing to increase    Medication Changes:  CONTINUE  Metformin 1,000 mg 1 tablet by mouth twice daily   Glimepiride 2 mg 1 tablet by mouth once daily at dinner   Farxiga 5 mg 1 tablet by mouth once daily     Future Considerations:  If PA approved start Mounjaro at 2.5 mg once weekly     Monitoring and Education:  Patient previously educated on common side effects and boxed warnings of Mounjaro at our first encounter as he was referred initially to start therapy  Informed patient that the PA process typically takes 24-72 hours however can take up to 1 week    I will reach out to patient to inform him of the PA decision once a determination is made. He was encouraged to reach out with any questions and/or concerns.           Pharmacy Follow-Up: To be determined pending PA determination   PCP Follow-Up: 05/29/20259    Continue all meds under the continuation of care with the referring provider and clinical pharmacy team.    Thank you,    Freedom Smith, PharmD   Clinical Pharmacist    Verbal consent to manage patient's drug therapy was obtained from the patient. They were informed they may decline to participate or withdraw from participation in pharmacy services at any time.

## 2025-01-09 ENCOUNTER — ANESTHESIA (OUTPATIENT)
Dept: GASTROENTEROLOGY | Facility: EXTERNAL LOCATION | Age: 65
End: 2025-01-09

## 2025-01-09 ENCOUNTER — APPOINTMENT (OUTPATIENT)
Dept: GASTROENTEROLOGY | Facility: EXTERNAL LOCATION | Age: 65
End: 2025-01-09
Payer: COMMERCIAL

## 2025-01-09 VITALS
HEIGHT: 70 IN | BODY MASS INDEX: 35.07 KG/M2 | OXYGEN SATURATION: 95 % | DIASTOLIC BLOOD PRESSURE: 80 MMHG | HEART RATE: 67 BPM | SYSTOLIC BLOOD PRESSURE: 136 MMHG | RESPIRATION RATE: 13 BRPM | TEMPERATURE: 97.3 F | WEIGHT: 245 LBS

## 2025-01-09 DIAGNOSIS — Z86.0100 HISTORY OF COLON POLYPS: Primary | ICD-10-CM

## 2025-01-09 DIAGNOSIS — D12.6 TUBULAR ADENOMA OF COLON: ICD-10-CM

## 2025-01-09 DIAGNOSIS — K63.5 POLYP OF ASCENDING COLON, UNSPECIFIED TYPE: ICD-10-CM

## 2025-01-09 PROCEDURE — 45385 COLONOSCOPY W/LESION REMOVAL: CPT | Performed by: STUDENT IN AN ORGANIZED HEALTH CARE EDUCATION/TRAINING PROGRAM

## 2025-01-09 PROCEDURE — 88305 TISSUE EXAM BY PATHOLOGIST: CPT | Mod: TC,ELYLAB | Performed by: STUDENT IN AN ORGANIZED HEALTH CARE EDUCATION/TRAINING PROGRAM

## 2025-01-09 RX ORDER — PROPOFOL 10 MG/ML
INJECTION, EMULSION INTRAVENOUS AS NEEDED
Status: DISCONTINUED | OUTPATIENT
Start: 2025-01-09 | End: 2025-01-09

## 2025-01-09 RX ORDER — LIDOCAINE HYDROCHLORIDE 20 MG/ML
INJECTION, SOLUTION INFILTRATION; PERINEURAL AS NEEDED
Status: DISCONTINUED | OUTPATIENT
Start: 2025-01-09 | End: 2025-01-09

## 2025-01-09 RX ADMIN — PROPOFOL 25 MG: 10 INJECTION, EMULSION INTRAVENOUS at 08:46

## 2025-01-09 RX ADMIN — PROPOFOL 25 MG: 10 INJECTION, EMULSION INTRAVENOUS at 08:44

## 2025-01-09 RX ADMIN — PROPOFOL 75 MG: 10 INJECTION, EMULSION INTRAVENOUS at 08:40

## 2025-01-09 RX ADMIN — PROPOFOL 25 MG: 10 INJECTION, EMULSION INTRAVENOUS at 08:48

## 2025-01-09 RX ADMIN — PROPOFOL 25 MG: 10 INJECTION, EMULSION INTRAVENOUS at 08:41

## 2025-01-09 RX ADMIN — PROPOFOL 25 MG: 10 INJECTION, EMULSION INTRAVENOUS at 08:53

## 2025-01-09 RX ADMIN — PROPOFOL 25 MG: 10 INJECTION, EMULSION INTRAVENOUS at 08:56

## 2025-01-09 RX ADMIN — LIDOCAINE HYDROCHLORIDE 2.5 ML: 20 INJECTION, SOLUTION INFILTRATION; PERINEURAL at 08:40

## 2025-01-09 RX ADMIN — PROPOFOL 25 MG: 10 INJECTION, EMULSION INTRAVENOUS at 08:50

## 2025-01-09 RX ADMIN — PROPOFOL 25 MG: 10 INJECTION, EMULSION INTRAVENOUS at 08:43

## 2025-01-09 SDOH — HEALTH STABILITY: MENTAL HEALTH: CURRENT SMOKER: 0

## 2025-01-09 ASSESSMENT — PAIN - FUNCTIONAL ASSESSMENT
PAIN_FUNCTIONAL_ASSESSMENT: 0-10

## 2025-01-09 ASSESSMENT — PAIN SCALES - GENERAL
PAINLEVEL_OUTOF10: 0 - NO PAIN
PAIN_LEVEL: 0
PAINLEVEL_OUTOF10: 0 - NO PAIN

## 2025-01-09 ASSESSMENT — COLUMBIA-SUICIDE SEVERITY RATING SCALE - C-SSRS
6. HAVE YOU EVER DONE ANYTHING, STARTED TO DO ANYTHING, OR PREPARED TO DO ANYTHING TO END YOUR LIFE?: NO
1. IN THE PAST MONTH, HAVE YOU WISHED YOU WERE DEAD OR WISHED YOU COULD GO TO SLEEP AND NOT WAKE UP?: NO
2. HAVE YOU ACTUALLY HAD ANY THOUGHTS OF KILLING YOURSELF?: NO

## 2025-01-09 NOTE — ANESTHESIA PREPROCEDURE EVALUATION
Patient: Sal Kruger    Procedure Information       Date/Time: 01/09/25 0830    Scheduled providers: Karlo Dowling DO    Procedure: COLONOSCOPY    Location: [facility]            Relevant Problems   Anesthesia (within normal limits)      Cardiac   (+) CAD (coronary artery disease), native coronary artery   (+) Dyslipidemia, goal LDL below 70   (+) Essential hypertension with goal blood pressure less than 130/85   (+) Subsequent non-ST elevation (NSTEMI) myocardial infarction      Pulmonary   (+) Lung nodules   (+) RICARDO (obstructive sleep apnea)      Neuro   (+) Bilateral carpal tunnel syndrome   (+) Cervical radiculopathy   (+) Sciatica of left side      GI   (+) Chronic GERD      /Renal   (+) Benign enlargement of prostate      Liver   (+) Fatty liver      Endocrine   (+) Obesity   (+) Type 2 diabetes mellitus without complication, without long-term current use of insulin (Multi)      Hematology (within normal limits)      Musculoskeletal   (+) Bilateral carpal tunnel syndrome   (+) DJD (degenerative joint disease) of knee   (+) Right knee DJD      HEENT   (+) Sinusitis      Skin   (+) Basal cell carcinoma of skin of nose   S/p CABG 2019, no chest pain, recently retired, walk 30 miins a day  Last farxiga-Sunday  Last cardiologist last year, no med changes will see next month  Clinical information reviewed:   Tobacco  Allergies  Meds   Med Hx  Surg Hx   Fam Hx  Soc Hx        NPO Detail:  NPO/Void Status  Date of Last Liquid: 01/09/25  Time of Last Liquid: 0030  Date of Last Solid: 01/08/25  Time of Last Solid: 0800 (English muffin)  Last Intake Type: Clear fluids  Time of Last Void: 0821         Physical Exam    Airway  Mallampati: II  TM distance: >3 FB  Neck ROM: full     Cardiovascular   Rhythm: regular  Rate: normal     Dental    Pulmonary   Breath sounds clear to auscultation     Abdominal   Abdomen: soft  Bowel sounds: normal       Smoker-Quit 1988    Anesthesia  Plan    History of general anesthesia?: yes  History of complications of general anesthesia?: no    ASA 3     MAC     The patient is not a current smoker.    intravenous induction   Anesthetic plan and risks discussed with patient.

## 2025-01-09 NOTE — DISCHARGE INSTRUCTIONS

## 2025-01-09 NOTE — ANESTHESIA POSTPROCEDURE EVALUATION
Patient: Sal Kruger    Procedure Summary       Date: 01/09/25 Room / Location: [facility]    Anesthesia Start: 0838 Anesthesia Stop: 0901    Procedure: COLONOSCOPY Diagnosis: History of colon polyps    Scheduled Providers: Karlo Dowling DO Responsible Provider: SUMMER Hoffman    Anesthesia Type: MAC ASA Status: 3            Anesthesia Type: MAC    Vitals Value Taken Time   /80 01/09/25 0922   Temp 36.3 °C (97.3 °F) 01/09/25 0901   Pulse 67 01/09/25 0922   Resp 13 01/09/25 0922   SpO2 95 % 01/09/25 0922       Anesthesia Post Evaluation    Patient location during evaluation: bedside  Patient participation: complete - patient participated  Level of consciousness: awake and alert  Pain score: 0  Pain management: adequate  Airway patency: patent  Cardiovascular status: acceptable  Respiratory status: acceptable  Hydration status: acceptable  Postoperative Nausea and Vomiting: none        No notable events documented.

## 2025-01-09 NOTE — SIGNIFICANT EVENT
Provider at bed side, exam findings reviewed with patient and family . Follow up recommendations reviewed.

## 2025-01-09 NOTE — SIGNIFICANT EVENT
Discharge instructions reviewed verbally and in writing. Patient verbalizes understanding. Pt denies questions or concerns at this time. Pt agreeable for d/c.

## 2025-01-09 NOTE — H&P
Outpatient NR Procedure H&P    Patient Profile  Name Sal Kruger  Date of Birth 1960  MRN [id number]  PCP [place] [facility]        Diagnosis: History of polyps.  Procedure(s):  Colonoscopy.    Allergies  No Known Allergies    Past Medical History   Past Medical History:   Diagnosis Date    Body mass index (BMI) 33.0-33.9, adult 06/24/2020    BMI 33.0-33.9,adult    Body mass index (BMI) 35.0-35.9, adult 09/28/2021    BMI 35.0-35.9,adult    Body mass index (BMI) 35.0-35.9, adult 05/20/2018    BMI 35.0-35.9,adult    Body mass index (BMI) 36.0-36.9, adult 05/18/2021    BMI 36.0-36.9,adult    Body mass index (BMI) 36.0-36.9, adult 10/05/2018    BMI 36.0-36.9,adult    Body mass index (BMI) 37.0-37.9, adult 03/01/2019    BMI 37.0-37.9, adult    Cough, unspecified     Cough    Elevation of levels of liver transaminase levels     ALT (SGPT) level raised    Encounter for other preprocedural examination 11/19/2019    Pre-op evaluation    Fatty (change of) liver, not elsewhere classified     Fatty liver    Hypertension     Liver disease, unspecified 05/13/2017    Chronic liver disease    Other conditions influencing health status 10/14/2016    Cardiovascular Stress Test    Other specified diabetes mellitus with other oral complications 05/31/2022    Diabetes mellitus of other type with oral complication, without long-term current use of insulin    Overweight 07/04/2015    Overweight    Pain in unspecified knee 04/30/2015    Knee pain    Personal history of colonic polyps     History of adenomatous polyp of colon    Personal history of other diseases of the nervous system and sense organs 01/15/2015    History of sleep apnea    Personal history of other endocrine, nutritional and metabolic disease 10/14/2016    History of diabetes mellitus    Personal history of other malignant neoplasm of skin 11/11/2022    History of basal cell cancer    Personal history of other specified conditions 11/06/2019    History of chest pain     Personal history of sudden cardiac arrest 08/02/2019    History of cardiac arrest    Prediabetes 01/15/2015    Prediabetes    Solitary pulmonary nodule 01/06/2018    Incidental pulmonary nodule, > 3mm and < 8mm    Strain of unspecified achilles tendon, initial encounter 07/04/2015    Achilles tendon sprain    Subsequent non-ST elevation (NSTEMI) myocardial infarction 06/21/2019    Subsequent non-ST elevation (NSTEMI) myocardial infarction    Unilateral primary osteoarthritis, unspecified knee 06/08/2015    Localized osteoarthritis of knee       Medication Reviewed - yes  Prior to Admission medications    Medication Sig Start Date End Date Taking? Authorizing Provider   acetaminophen 500 mg capsule Take by mouth.   Yes Historical Provider, MD   aspirin 81 mg capsule Take 81 mg by mouth twice a day.   Yes Historical Provider, MD   atorvastatin (Lipitor) 80 mg tablet Take 1 tablet (80 mg) by mouth once daily. 9/20/24  Yes [place] Sage MD   cholecalciferol (Vitamin D-3) 50 mcg (2,000 unit) capsule Take by mouth. 11/19/20  Yes Historical Provider, MD   dapagliflozin propanediol (Farxiga) 5 mg Take 1 tablet (5 mg) by mouth once daily. 12/11/24 2/9/25 Yes [place] Sage MD   lisinopril 40 mg tablet Take 1 tablet (40 mg) by mouth once daily. 9/20/24  Yes [place] Sage MD   metFORMIN (Glucophage) 1,000 mg tablet Take 1 tablet (1,000 mg) by mouth 2 times daily (morning and late afternoon). 9/20/24  Yes [place] Sage MD   metoprolol tartrate (Lopressor) 25 mg tablet Take 1 tablet (25 mg) by mouth 2 times a day. 6/3/24  Yes [place] Sage MD   blood sugar diagnostic (OneTouch Verio test strips) strip Test once daily 7/17/24   Lake Sage MD   ibuprofen 200 mg tablet Take by mouth.  Patient not taking: Reported on 1/9/2025    Historical Provider, MD   lancets misc Once daily 7/16/24   L[place] Sage MD   OneTouch Delica Plus Lancet 30 gauge misc once daily. 7/16/24   Historical MD Courtney       Physical Exam  Vitals:    01/09/25  0813   BP: 155/88   Pulse: 75   Resp: 19   Temp: 36.2 °C (97.2 °F)   SpO2: 96%      Weight   Vitals:    01/09/25 0813   Weight: 111 kg (245 lb)     BMI Body mass index is 35.15 kg/m².    General: A&Ox3, NAD.  HEENT: AT/NC.   CV: RRR. No murmur.  Resp: CTA bilaterally. No wheezing, rhonchi or rales.   GI: Soft, NT/ND.  Extrem: No edema. Pulses intact.  Skin: No Jaundice.   Neuro: No focal deficits.   Psych: Normal mood and affect.        Oropharyngeal Classification II (hard and soft palate, upper portion of tonsils and uvula visible)  ASA PS Classification 3  Sedation Plan: Deep Sedation.  Procedure Plan - pre-procedural (re)assesment completed by physician:  discharge/transfer patient when discharge criteria met    Karlo Dowling DO  1/9/2025 8:38 AM

## 2025-01-14 ENCOUNTER — TELEPHONE (OUTPATIENT)
Dept: PHARMACY | Facility: HOSPITAL | Age: 65
End: 2025-01-14
Payer: COMMERCIAL

## 2025-01-14 NOTE — TELEPHONE ENCOUNTER
Prior Authorization Approval    Prior authorization approved for Mounjaro.     Spoke with patient today to notify. Informed patient that is plan requires that he fill the Mounjaro through the [facility] pharmacy. Since he has to fill through them, I am unable to see what his out of pocket cost would be.     Patient would like to come in-office to for in-person demonstration and to give his first dose in-office. He was instructed to reach out to me once he receives the Mounjaro shipped from the pharmacy and we will schedule him to come in the next Wednesday after.     He was encouraged to reach out with any questions and/or concerns prior to then.     Thank you,  Freedom Smith, PharmD

## 2025-01-16 LAB
LABORATORY COMMENT REPORT: NORMAL
PATH REPORT.FINAL DX SPEC: NORMAL
PATH REPORT.GROSS SPEC: NORMAL
PATH REPORT.RELEVANT HX SPEC: NORMAL
PATH REPORT.TOTAL CANCER: NORMAL
RESIDENT REVIEW: NORMAL

## 2025-01-22 DIAGNOSIS — K63.5 POLYP OF COLON, UNSPECIFIED PART OF COLON, UNSPECIFIED TYPE: Primary | ICD-10-CM

## 2025-01-26 RX ORDER — POLYETHYLENE GLYCOL 3350, SODIUM SULFATE ANHYDROUS, SODIUM BICARBONATE, SODIUM CHLORIDE, POTASSIUM CHLORIDE 236; 22.74; 6.74; 5.86; 2.97 G/4L; G/4L; G/4L; G/4L; G/4L
4 POWDER, FOR SOLUTION ORAL ONCE
Qty: 4000 ML | Refills: 0 | Status: SHIPPED | OUTPATIENT
Start: 2025-01-26 | End: 2025-01-26

## 2025-01-29 ENCOUNTER — HOSPITAL ENCOUNTER (OUTPATIENT)
Dept: RADIOLOGY | Facility: HOSPITAL | Age: 65
Discharge: HOME | End: 2025-01-29
Payer: COMMERCIAL

## 2025-01-29 ENCOUNTER — APPOINTMENT (OUTPATIENT)
Dept: PRIMARY CARE | Facility: CLINIC | Age: 65
End: 2025-01-29
Payer: COMMERCIAL

## 2025-01-29 DIAGNOSIS — K76.0 FATTY LIVER DISEASE, NONALCOHOLIC: ICD-10-CM

## 2025-01-29 DIAGNOSIS — K76.0 HEPATIC STEATOSIS: ICD-10-CM

## 2025-01-29 DIAGNOSIS — R74.01 ALT (SGPT) LEVEL RAISED: ICD-10-CM

## 2025-01-29 PROCEDURE — 76981 USE PARENCHYMA: CPT

## 2025-01-29 PROCEDURE — 76981 USE PARENCHYMA: CPT | Performed by: RADIOLOGY

## 2025-01-31 NOTE — RESULT ENCOUNTER NOTE
Tom    Thanks for doing this additional liver scan.  The radiologist notes that the studies suggest that there is some evidence that the fatty liver condition may have some more worrisome features.    To look into this further, I would recommend that you set up an appointment with one of the [place] liver specialists.  His name is Dr. Marquez Green, and he practices at [facility].  Please call that practice at [phone] to set up an appointment with him.    Please take this report with you when you meet with him, as he will be able to review this and determine what additional evaluation is needed.    Thanks again for doing this.  Please do not hesitate to let me know if you have any questions about this.    Sincerely,  L[place] Sage MD

## 2025-02-06 ENCOUNTER — HOSPITAL ENCOUNTER (OUTPATIENT)
Dept: GASTROENTEROLOGY | Facility: HOSPITAL | Age: 65
Discharge: HOME | End: 2025-02-06
Payer: COMMERCIAL

## 2025-02-06 ENCOUNTER — ANESTHESIA (OUTPATIENT)
Dept: GASTROENTEROLOGY | Facility: HOSPITAL | Age: 65
End: 2025-02-06
Payer: COMMERCIAL

## 2025-02-06 ENCOUNTER — ANESTHESIA EVENT (OUTPATIENT)
Dept: GASTROENTEROLOGY | Facility: HOSPITAL | Age: 65
End: 2025-02-06
Payer: COMMERCIAL

## 2025-02-06 VITALS
HEIGHT: 70 IN | TEMPERATURE: 97.9 F | DIASTOLIC BLOOD PRESSURE: 66 MMHG | OXYGEN SATURATION: 96 % | BODY MASS INDEX: 34.36 KG/M2 | WEIGHT: 240 LBS | HEART RATE: 66 BPM | SYSTOLIC BLOOD PRESSURE: 158 MMHG | RESPIRATION RATE: 18 BRPM

## 2025-02-06 DIAGNOSIS — K63.5 POLYP OF ASCENDING COLON, UNSPECIFIED TYPE: Primary | ICD-10-CM

## 2025-02-06 LAB — GLUCOSE BLD MANUAL STRIP-MCNC: 100 MG/DL (ref 74–99)

## 2025-02-06 PROCEDURE — 2500000004 HC RX 250 GENERAL PHARMACY W/ HCPCS (ALT 636 FOR OP/ED): Performed by: INTERNAL MEDICINE

## 2025-02-06 PROCEDURE — 3700000001 HC GENERAL ANESTHESIA TIME - INITIAL BASE CHARGE

## 2025-02-06 PROCEDURE — 7100000010 HC PHASE TWO TIME - EACH INCREMENTAL 1 MINUTE

## 2025-02-06 PROCEDURE — 3700000002 HC GENERAL ANESTHESIA TIME - EACH INCREMENTAL 1 MINUTE

## 2025-02-06 PROCEDURE — A45390 PR COLONOSCOPY FLX W/ENDOSCOPIC MUCOSAL RESECTION: Performed by: ANESTHESIOLOGIST ASSISTANT

## 2025-02-06 PROCEDURE — A45390 PR COLONOSCOPY FLX W/ENDOSCOPIC MUCOSAL RESECTION: Performed by: ANESTHESIOLOGY

## 2025-02-06 PROCEDURE — 82947 ASSAY GLUCOSE BLOOD QUANT: CPT

## 2025-02-06 PROCEDURE — 2720000007 HC OR 272 NO HCPCS

## 2025-02-06 PROCEDURE — 7100000009 HC PHASE TWO TIME - INITIAL BASE CHARGE

## 2025-02-06 PROCEDURE — 2500000004 HC RX 250 GENERAL PHARMACY W/ HCPCS (ALT 636 FOR OP/ED): Performed by: ANESTHESIOLOGIST ASSISTANT

## 2025-02-06 PROCEDURE — 45390 COLONOSCOPY W/RESECTION: CPT | Performed by: INTERNAL MEDICINE

## 2025-02-06 RX ORDER — METHYLENE BLUE 5 MG/ML
INJECTION INTRAVENOUS AS NEEDED
Status: COMPLETED | OUTPATIENT
Start: 2025-02-06 | End: 2025-02-06

## 2025-02-06 RX ORDER — CIPROFLOXACIN 2 MG/ML
INJECTION, SOLUTION INTRAVENOUS AS NEEDED
Status: DISCONTINUED | OUTPATIENT
Start: 2025-02-06 | End: 2025-02-06

## 2025-02-06 RX ORDER — PROPOFOL 10 MG/ML
INJECTION, EMULSION INTRAVENOUS AS NEEDED
Status: DISCONTINUED | OUTPATIENT
Start: 2025-02-06 | End: 2025-02-06

## 2025-02-06 RX ORDER — EPINEPHRINE 0.1 MG/ML
INJECTION INTRACARDIAC; INTRAVENOUS AS NEEDED
Status: COMPLETED | OUTPATIENT
Start: 2025-02-06 | End: 2025-02-06

## 2025-02-06 RX ADMIN — METHYLENE BLUE 5 MG: 5 INJECTION INTRAVENOUS at 08:28

## 2025-02-06 RX ADMIN — PROPOFOL 200 MCG/KG/MIN: 10 INJECTION, EMULSION INTRAVENOUS at 08:01

## 2025-02-06 RX ADMIN — EPINEPHRINE 0.1 MG: 0.1 INJECTION INTRACARDIAC; INTRAVENOUS at 08:30

## 2025-02-06 RX ADMIN — PROPOFOL 70 MG: 10 INJECTION, EMULSION INTRAVENOUS at 08:00

## 2025-02-06 RX ADMIN — PROPOFOL 30 MG: 10 INJECTION, EMULSION INTRAVENOUS at 08:17

## 2025-02-06 RX ADMIN — CIPROFLOXACIN 400 MG: 400 INJECTION, SOLUTION INTRAVENOUS at 09:04

## 2025-02-06 SDOH — HEALTH STABILITY: MENTAL HEALTH: CURRENT SMOKER: 0

## 2025-02-06 ASSESSMENT — PAIN - FUNCTIONAL ASSESSMENT
PAIN_FUNCTIONAL_ASSESSMENT: 0-10

## 2025-02-06 ASSESSMENT — PAIN SCALES - GENERAL
PAIN_LEVEL: 0
PAINLEVEL_OUTOF10: 0 - NO PAIN

## 2025-02-06 NOTE — Clinical Note
Pre - known polyp  Colonoscopy - EMR, clipping, lifting agent injected  Post - diverticulosis, ascending polyp, 6 clips in place, 65 mL lifting agent injected

## 2025-02-06 NOTE — ANESTHESIA POSTPROCEDURE EVALUATION
Patient: Sal Kruger    Procedure Summary       Date: 02/06/25 Room / Location: [facility]    Anesthesia Start: 0755 Anesthesia Stop: 0917    Procedure: COLONOSCOPY Diagnosis: Polyp of ascending colon, unspecified type    Scheduled Providers: Goran Weeks MD Responsible Provider: Elisha Guzman MD    Anesthesia Type: MAC ASA Status: 3            Anesthesia Type: MAC    Vitals Value Taken Time   /66 02/06/25 0956   Temp 36.6 °C (97.9 °F) 02/06/25 0956   Pulse 66 02/06/25 0956   Resp 18 02/06/25 0956   SpO2 96 % 02/06/25 0956       Anesthesia Post Evaluation    Patient location during evaluation: PACU  Patient participation: complete - patient participated  Level of consciousness: sleepy but conscious, responsive to verbal stimuli, responsive to light touch and responsive to physical stimuli  Pain score: 0  Pain management: satisfactory to patient  Airway patency: patent  Two or more strategies used to mitigate risk of obstructive sleep apnea  Cardiovascular status: acceptable, hemodynamically stable and stable  Respiratory status: acceptable, unassisted, spontaneous ventilation and nonlabored ventilation  Hydration status: acceptable  Postoperative Nausea and Vomiting: none        No notable events documented.

## 2025-02-06 NOTE — DISCHARGE INSTRUCTIONS
Patient Instructions after a Colonoscopy      The anesthetics, sedatives or narcotics which were given to you today will be acting in your body for the next 24 hours, so you might feel a little sleepy or groggy.  This feeling should slowly wear off. Carefully read and follow the instructions.     You received sedation today:  - Do not drive or operate any machinery or power tools of any kind.   - No alcoholic beverages today, not even beer or wine.  - Do not make any important decisions or sign any legal documents.  - No over the counter medications that contain alcohol or that may cause drowsiness.  - Do not make any important decisions or sign any legal documents.  - Make sure you have someone with you for first 24 hours.    While it is common to experience mild to moderate abdominal distention, gas, or belching after your procedure, if any of these symptoms occur following discharge from the GI Lab or within one week of having your procedure, call the [facility] to be advised whether a visit to your nearest Urgent Care or Emergency Department is indicated.  Take this paper with you if you go.     - If you develop an allergic reaction to the medications that were given during your procedure such as difficulty breathing, rash, hives, severe nausea, vomiting or lightheadedness.  - If you experience chest pain, shortness of breath, severe abdominal pain, fevers and chills.  -If you develop signs and symptoms of bleeding such as blood in your spit, if your stools turn black, tarry, or bloody  - If you have not urinated within 8 hours following your procedure.  - If your IV site becomes painful, red, inflamed, or looks infected.    If you received a biopsy/polypectomy/sphincterotomy the following instructions apply below:    __ Do not use Aspirin containing products, non-steroidal medications or anti-coagulants for one week following your procedure. (Examples of these types of medications are: Advil,  Arthrotec, Aleve, Coumadin, Ecotrin, Heparin, Ibuprofen, Indocin, Motrin, Naprosyn, Nuprin, Plavix, Vioxx, and Voltarin, or their generic forms.  This list is not all-inclusive.  Check with your physician or pharmacist before resuming medications.)   __ Eat a soft diet today.  Avoid foods that are poorly digested for the next 24 hours.  These foods would include: nuts, beans, lettuce, red meats, and fried foods. Start with liquids and advance your diet as tolerated, gradually work up to eating solids.   __ Do not have a Barium Study or Enema for one week.    Your physician recommends the additional following instructions:    -You have a contact number available for emergencies. The signs and symptoms of potential delayed complications were discussed with you. You may return to normal activities tomorrow.  -Resume your previous diet.  -Continue your present medications.   -We are waiting for your pathology results.  -Your physician has recommended a repeat colonoscopy (date to be determined after pending pathology results are reviewed) for surveillance based on pathology results.  -The findings and recommendations have been discussed with you.  -The findings and recommendations were discussed with your family.  - Please see Medication Reconciliation Form for new medication/medications prescribed.       If you experience any problems or have any questions following discharge from the GI Lab, please call:        Nurse Signature                                                                        Date___________________                                                                            Patient/Responsible Party Signature                                        Date___________________

## 2025-02-06 NOTE — ANESTHESIA PREPROCEDURE EVALUATION
Patient: Sal Kruger    Procedure Information       Anesthesia Start Date/Time: 02/06/25 0755    Scheduled providers: Goran Weeks MD    Procedure: COLONOSCOPY    Location: [facility]            Relevant Problems   Cardiac   (+) CAD (coronary artery disease), native coronary artery   (+) Dyslipidemia, goal LDL below 70   (+) Essential hypertension with goal blood pressure less than 130/85   (+) Subsequent non-ST elevation (NSTEMI) myocardial infarction      Pulmonary   (+) Lung nodules   (+) RICARDO (obstructive sleep apnea)      Neuro   (+) Bilateral carpal tunnel syndrome   (+) Cervical radiculopathy   (+) Sciatica of left side      GI   (+) Chronic GERD      /Renal   (+) Benign enlargement of prostate      Liver   (+) Fatty liver      Endocrine   (+) Obesity   (+) Type 2 diabetes mellitus without complication, without long-term current use of insulin (Multi)      Musculoskeletal   (+) Bilateral carpal tunnel syndrome   (+) DJD (degenerative joint disease) of knee   (+) Right knee DJD      HEENT   (+) Sinusitis      Skin   (+) Basal cell carcinoma of skin of nose       Clinical information reviewed:   Tobacco  Allergies  Meds  Problems  Med Hx  Surg Hx   Fam Hx          NPO Detail:  NPO/Void Status  Date of Last Liquid: 02/06/25  Time of Last Liquid: 0230  Date of Last Solid: 02/03/25         Physical Exam    Airway  Mallampati: III  TM distance: >3 FB  Neck ROM: full     Cardiovascular - normal exam  Rhythm: regular  Rate: normal     Dental   (+) upper dentures, lower dentures     Pulmonary   Breath sounds clear to auscultation  (+) decreased breath sounds     Abdominal   (+) obese  Abdomen: soft  Bowel sounds: normal           Anesthesia Plan    History of general anesthesia?: yes  History of complications of general anesthesia?: no    ASA 3     general and MAC   (MAC or TIVA)  The patient is not a current smoker.  Patient was previously instructed to abstain from smoking on day of  procedure.  Patient did not smoke on day of procedure.  Education provided regarding risk of obstructive sleep apnea.  intravenous induction   Anesthetic plan and risks discussed with patient.  Use of blood products discussed with patient who consented to blood products.    Plan discussed with [facility].

## 2025-02-12 ENCOUNTER — APPOINTMENT (OUTPATIENT)
Dept: PHARMACY | Facility: HOSPITAL | Age: 65
End: 2025-02-12
Payer: COMMERCIAL

## 2025-02-12 DIAGNOSIS — E66.812 CLASS 2 SEVERE OBESITY WITH SERIOUS COMORBIDITY AND BODY MASS INDEX (BMI) OF 36.0 TO 36.9 IN ADULT, UNSPECIFIED OBESITY TYPE: ICD-10-CM

## 2025-02-12 DIAGNOSIS — E66.01 CLASS 2 SEVERE OBESITY WITH SERIOUS COMORBIDITY AND BODY MASS INDEX (BMI) OF 36.0 TO 36.9 IN ADULT, UNSPECIFIED OBESITY TYPE: ICD-10-CM

## 2025-02-12 DIAGNOSIS — E11.9 TYPE 2 DIABETES MELLITUS WITHOUT COMPLICATION, WITHOUT LONG-TERM CURRENT USE OF INSULIN (MULTI): ICD-10-CM

## 2025-02-12 LAB
LABORATORY COMMENT REPORT: NORMAL
PATH REPORT.FINAL DX SPEC: NORMAL
PATH REPORT.GROSS SPEC: NORMAL
PATH REPORT.RELEVANT HX SPEC: NORMAL
PATH REPORT.TOTAL CANCER: NORMAL

## 2025-02-12 RX ORDER — TIRZEPATIDE 5 MG/.5ML
5 INJECTION, SOLUTION SUBCUTANEOUS WEEKLY
Qty: 2 ML | Refills: 0 | Status: SHIPPED | OUTPATIENT
Start: 2025-02-12

## 2025-02-12 NOTE — ASSESSMENT & PLAN NOTE
Patient's goal A1c is < 7%.  Is pt at goal? No, most recent A1c was 7.2% on 11/19/24 which had increased from 6.9% on 06/03/24.   Patient's SMBGs have improved and have been ranging in the 100s-130s since starting Mounjaro        Rationale for plan:   Patient has tolerated Mounjaro well for the first month and has seen improvement in his fasting blood sugar readings  Discussed that he should continue Farxiga for now, if his A1c significantly improves we can discuss reducing therapy at that time  Is agreeable to titrating up his Mounjaro to 5 mg once weekly     Medication Changes:  CONTINUE  Metformin 1,000 mg 1 tablet by mouth twice daily   Farxiga 5 mg 1 tablet by mouth once daily   INCREASE  Mounjaro 5 mg under the skin once weekly (increased from 2.5 mg)    Future Considerations:  If patient tolerates the increased dose of Mounjaro well and would like to continue titrating up for weight loss effects could increase up to 7.5 mg once weekly at next follow-up    Monitoring and Education:  Will continue to monitor for new/worsening side effects to ensure patient is tolerating Mounjaro well   Patient will continue checking blood sugar at home and we will go over updated readings at next encounter     We will plan to follow-up in 4 weeks to see how patient has done with the increased dose of Mounjaro. He was encouraged to reach out with any questions and/or concerns prior to then.

## 2025-02-12 NOTE — PROGRESS NOTES
Clinical Pharmacy Appointment    Patient ID: Sal Kruger is a 64 y.o. male who presents for Diabetes.    Pt is here for Follow Up appointment.     Referring Provider: Lake Sage MD  PCP: [place] [facility] MD   Last visit with PCP: 11/13/2024   Next visit with PCP: 05/29/2025    Subjective     Patient was referred to the pharmacy team for assistance with medication management of his diabetes and obesity. On 11/19/24 patient's A1c increased up to 7.2%.     At last pharmacy encounter his fasting blood sugar readings increased up to the 130s-150s despite starting Farxiga 5 mg once daily. Insurance was able to approve Mounjaro once weekly and he started this on 01/27/25. We are following-up today to see how he has tolerated the Mounjaro and to discuss titrating up to 5 mg once weekly if tolerable.     HPI  DIABETES MELLITUS TYPE 2:    Diagnosed with diabetes: ~4-5 years per patient. Known diabetic complications: CAD, obesity.  Does patient follow with Endocrinology: No  Last optometry exam: November 2024  Most recent visit in Podiatry: does not follow with podiatry -- patient denies sores or cuts on feet today      Current diabetic medications include:  Farxiga 5 mg once daily   Metformin 1,000 mg twice daily   Mounjaro 2.5 mg once weekly     Historical diabetic medications include:   Glimepiride 2 mg (stopped when patient was started on Farxiga)    Adverse Effects: notes ~1 hour after his injection some mild nausea, but this only lasts 1-2 hours then improves; no other side effects reported today     Glucose Readings:  Glucometer/CGM Type: OneTouch Verio Glucometer   Patient tests BG a couple times per week typically fasting in the morning     Current home BG readings:   Notes they have improved  Was into the 160s-170s prior to starting Mounjaro  Since starting Mounjaro has ranged from the 100s-130s    Previous home BG readings:   Fasting had been in the 130s-150s    Any episodes of hypoglycemia? No, denies  signs/symptoms of hypoglycemia  Does pt have proteinuria? Unknown, no completed lab for UACR in chart     Lifestyle:  Diet: 3 meals/day.   Breakfast: eggs, turkey sausage, sometimes toast or an english muffin and black coffee   Lunch: leftover from the night before, chicken salad, tuna salad   Dinner: all meals are cooked at home, tries to get a vegetable in with meals   Snacks: does snack in the afternoon - piece of fruit, will have pretzels at night   Drinks: water, Gatorade Zero   Physical Activity:   None currently   Has been taking care of his wife so hasn't been able to leave the house much  Now that she is better he wants to try working activity in     Secondary Prevention:  Statin? Yes, Atorvastatin 80 mg daily   ACE-I/ARB? Yes, Lisinopril 40 mg daily   Aspirin? Yes, 81 mg daily     Pertinent PMH Review:  PMH of Pancreatitis: No  PMH of Retinopathy: No  PMH of MTC/MEN 2: No    Immunizations:  Influenza? 11/13/24  COVID? 01/17/22  Pneumonia? PCV20 06/03/24, PPSV23 05/12/17  Shingles? 06/01/20, 09/01/20  RSV: None - states Dr. Sage discussed with him about getting     Drug Interactions  No relevant drug interactions were noted.    Medication System Management  Adherence/Organization: does not typically forget his medications, does use a pill box to help with organization   Affordability/Accessibility: no issues reported today     Patient's preferred pharmacy:     [facility] [address] [place] [id number]  Phone: [phone] Fax: [phone]    [facility] - [place], [place] - [address]  [address] [address]  Phone: [phone] Fax: [phone]      Objective   No Known Allergies  Social History     Social History Narrative    Not on file      Medication Reconciliation:  No changes made today     Medication Review  Current Outpatient Medications   Medication Instructions    acetaminophen 500 mg  "capsule Take by mouth.    aspirin 81 mg, 2 times daily    atorvastatin (LIPITOR) 80 mg, oral, Daily    blood sugar diagnostic (OneTouch Verio test strips) strip Test once daily    cholecalciferol (Vitamin D-3) 50 mcg (2,000 unit) capsule Take by mouth.    Farxiga 5 mg, oral, Daily    lancets misc Once daily    lisinopril 40 mg, oral, Daily    metFORMIN (GLUCOPHAGE) 1,000 mg, oral, 2 times daily (morning and late afternoon)    metoprolol tartrate (LOPRESSOR) 25 mg, oral, 2 times daily    Mounjaro 5 mg, subcutaneous, Weekly    OneTouch Delica Plus Lancet 30 gauge misc Daily      Vitals  BP Readings from Last 2 Encounters:   02/06/25 158/66   01/09/25 136/80     BMI Readings from Last 1 Encounters:   02/06/25 34.44 kg/m²      Labs  A1C  Lab Results   Component Value Date    HGBA1C 7.2 (H) 11/19/2024    HGBA1C 6.9 (H) 06/03/2024    HGBA1C 6.3 (A) 09/21/2023     BMP  Lab Results   Component Value Date    CALCIUM 9.9 11/19/2024     11/19/2024    K 4.8 11/19/2024    CO2 28 11/19/2024     11/19/2024    BUN 21 11/19/2024    CREATININE 1.04 11/19/2024    EGFR 79 11/19/2024     LFTs  Lab Results   Component Value Date    ALT 60 (H) 12/18/2024    AST 35 12/18/2024    ALKPHOS 61 12/18/2024    BILITOT 0.9 12/18/2024     FLP  Lab Results   Component Value Date    TRIG 314 (H) 11/19/2024    CHOL 123 11/19/2024    LDLF 38 09/21/2023    LDLCALC 25 11/19/2024    HDL 35.4 11/19/2024     Urine Microalbumin  No results found for: \"MICROALBCREA\"    Weight Management  Wt Readings from Last 3 Encounters:   02/06/25 109 kg (240 lb)   01/09/25 111 kg (245 lb)   11/13/24 113 kg (249 lb 3.2 oz)      Estimated body mass index is 34.44 kg/m² as calculated from the following:    Height as of 2/6/25: 1.778 m (5' 10\").    Weight as of 2/6/25: 109 kg (240 lb).      Assessment/Plan   Problem List Items Addressed This Visit       Obesity    Relevant Medications    tirzepatide (Mounjaro) 5 mg/0.5 mL pen injector    Other Relevant Orders    " Referral to Clinical Pharmacy    Type 2 diabetes mellitus without complication, without long-term current use of insulin (Multi)     Patient's goal A1c is < 7%.  Is pt at goal? No, most recent A1c was 7.2% on 11/19/24 which had increased from 6.9% on 06/03/24.   Patient's SMBGs have improved and have been ranging in the 100s-130s since starting Mounjaro        Rationale for plan:   Patient has tolerated Mounjaro well for the first month and has seen improvement in his fasting blood sugar readings  Discussed that he should continue Farxiga for now, if his A1c significantly improves we can discuss reducing therapy at that time  Is agreeable to titrating up his Mounjaro to 5 mg once weekly     Medication Changes:  CONTINUE  Metformin 1,000 mg 1 tablet by mouth twice daily   Farxiga 5 mg 1 tablet by mouth once daily   INCREASE  Mounjaro 5 mg under the skin once weekly (increased from 2.5 mg)    Future Considerations:  If patient tolerates the increased dose of Mounjaro well and would like to continue titrating up for weight loss effects could increase up to 7.5 mg once weekly at next follow-up    Monitoring and Education:  Will continue to monitor for new/worsening side effects to ensure patient is tolerating Mounjaro well   Patient will continue checking blood sugar at home and we will go over updated readings at next encounter     We will plan to follow-up in 4 weeks to see how patient has done with the increased dose of Mounjaro. He was encouraged to reach out with any questions and/or concerns prior to then.          Relevant Medications    tirzepatide (Mounjaro) 5 mg/0.5 mL pen injector    Other Relevant Orders    Referral to Clinical Pharmacy     Pharmacy Follow-Up: 03/12/2025   PCP Follow-Up: 05/29/20259    Continue all meds under the continuation of care with the referring provider and clinical pharmacy team.    Thank you,    Freedom Smith, PharmD   Clinical Pharmacist    Verbal consent to manage patient's  drug therapy was obtained from the patient. They were informed they may decline to participate or withdraw from participation in pharmacy services at any time.

## 2025-03-03 PROBLEM — R93.1 AGATSTON CORONARY ARTERY CALCIUM SCORE GREATER THAN 400: Status: RESOLVED | Noted: 2023-05-12 | Resolved: 2025-03-03

## 2025-03-03 PROBLEM — I22.2 SUBSEQUENT NON-ST ELEVATION (NSTEMI) MYOCARDIAL INFARCTION: Status: RESOLVED | Noted: 2023-05-12 | Resolved: 2025-03-03

## 2025-03-03 PROBLEM — I25.10 CORONARY ARTERY CALCIFICATION SEEN ON CAT SCAN: Status: RESOLVED | Noted: 2023-05-12 | Resolved: 2025-03-03

## 2025-03-03 NOTE — PROGRESS NOTES
Chief Complaint:   No chief complaint on file.     History Of Present Illness:    Sal Kruger is a 64 y.o. male with a history of CAD s/p CABG 2019, hypertension, and dyslipidemia who is being evaluated for follow-up.     Doing well from a cardiovascular standpoint.  Denies any exertional chest pain or shortness of breath.    Did retire this year.  His wife had to undergo surgery and afterwards had a neurologic setback.  Is still improving however needed 24-hour care after surgery and that is why he decided to retire.  He was told that anytime someone has a cerebral injury (she had a ruptured cerebral aneurysm) that there is always a chance that they can relapse after having anesthesia.    He did start on Mounjaro and has noticed that his sugars are much better controlled.  He is only lost a couple pounds but is only been on the medication for about 4 weeks.     Three-vessel CABG 12/5/19 with LIMA-LAD, SVG-OM, and VG-diagonal     Catheterization 11/1/19 with severe coronary disease.      PCI to the circumflex and diagonal branch 6/28/19. Circumflex ostium stented with a Resolute Dave 2.5 x 12 mm PAUL. Third diagonal branch with 85% proximal stenosis. Resolute dave 2.5 x 8 mm PAUL.     Catheterization 5/10/19 for non-ST elevation MI. PCI to the RCA with overlapping Resolute Dave PAUL. Ostial RCA with 4.0 x 20 mm, mid with 3.0 x 22 mm, distal with 2.5 x 20 mm. Right PDA with a 2.5 x 22 mm Resolute Dave PAUL.  Left main: Mild disease.   LAD with mild disease. First large diagonal branch with 90% proximal stenosis.   Circumflex with ostial 80% stenosis.     Echocardiogram 5/11/19 with normal LV size and function, EF 60-65%.      Past Medical History:  He has a past medical history of Body mass index (BMI) 33.0-33.9, adult (06/24/2020), Body mass index (BMI) 35.0-35.9, adult (09/28/2021), Body mass index (BMI) 35.0-35.9, adult (05/20/2018), Body mass index (BMI) 36.0-36.9, adult (05/18/2021), Body mass index (BMI)  36.0-36.9, adult (10/05/2018), Body mass index (BMI) 37.0-37.9, adult (03/01/2019), Cough, unspecified, Elevation of levels of liver transaminase levels, Encounter for other preprocedural examination (11/19/2019), Fatty (change of) liver, not elsewhere classified, Hypertension, Liver disease, unspecified (05/13/2017), Other conditions influencing health status (10/14/2016), Other specified diabetes mellitus with other oral complications (05/31/2022), Overweight (07/04/2015), Pain in unspecified knee (04/30/2015), Personal history of colonic polyps, Personal history of other diseases of the nervous system and sense organs (01/15/2015), Personal history of other endocrine, nutritional and metabolic disease (10/14/2016), Personal history of other malignant neoplasm of skin (11/11/2022), Personal history of other specified conditions (11/06/2019), Personal history of sudden cardiac arrest (08/02/2019), Prediabetes (01/15/2015), Solitary pulmonary nodule (01/06/2018), Strain of unspecified achilles tendon, initial encounter (07/04/2015), Subsequent non-ST elevation (NSTEMI) myocardial infarction (06/21/2019), and Unilateral primary osteoarthritis, unspecified knee (06/08/2015).    Past Surgical History:  He has a past surgical history that includes Ventral hernia repair (03/26/2014); Lithotripsy (01/15/2016); Other surgical history (01/15/2016); Skin cancer excision (01/18/2016); Other surgical history (12/11/2019); and US guided needle liver biopsy (8/10/2016).      Social History:  He reports that he quit smoking about 37 years ago. His smoking use included cigarettes. He has never used smokeless tobacco. He reports that he does not currently use alcohol. He reports that he does not use drugs.    Family History:  No family history on file.     Allergies:  Patient has no known allergies.    Outpatient Medications:  Current Outpatient Medications   Medication Instructions    acetaminophen 500 mg capsule Take by mouth.     "aspirin 81 mg, 2 times daily    atorvastatin (LIPITOR) 80 mg, oral, Daily    blood sugar diagnostic (OneTouch Verio test strips) strip Test once daily    cholecalciferol (Vitamin D-3) 50 mcg (2,000 unit) capsule Take by mouth.    Farxiga 5 mg, oral, Daily    lancets misc Once daily    lisinopril 40 mg, oral, Daily    metFORMIN (GLUCOPHAGE) 1,000 mg, oral, 2 times daily (morning and late afternoon)    metoprolol tartrate (LOPRESSOR) 25 mg, oral, 2 times daily    Mounjaro 5 mg, subcutaneous, Weekly    OneTouch Delica Plus Lancet 30 gauge misc Daily       Last Recorded Vitals:  Visit Vitals  /72 (BP Location: Left arm, Patient Position: Sitting)   Pulse 72   Ht 1.778 m (5' 10\")   Wt 109 kg (241 lb)   SpO2 97%   BMI 34.58 kg/m²   Smoking Status Former   BSA 2.32 m²      LASTWT(3):   Wt Readings from Last 3 Encounters:   03/04/25 109 kg (241 lb)   02/06/25 109 kg (240 lb)   01/09/25 111 kg (245 lb)       Physical Exam:  In general: alert and in no acute distress.   HEENT: Carotid upstrokes normal with no bruits. JVP is normal.   Pulmonary: Clear to auscultation bilaterally.  Cardiovascular: S1,S2, regular. No appreciable murmurs, rubs or gallops.   Lower extremities: Warm. 2+ distal pulses. No edema.     Last Labs:  CBC -  Recent Labs     06/20/20  0805 12/09/19  0358 12/08/19  0446   WBC 8.5 10.2 13.2*   HGB 15.9 12.5* 12.6*   HCT 49.4 38.2* 39.2*    191 165   MCV 95 92 94       CMP -  Recent Labs     11/19/24  0702 06/03/24  1621 09/21/23  0716 06/20/20  0805 12/09/19  0358 12/08/19  0446 12/07/19  2034    141 143   < > 137 138  --    K 4.8 4.4 4.4   < > 4.1 4.2 4.4    104 106   < > 100 101  --    CO2 28 26 28   < > 28 28  --    ANIONGAP 16 15 13   < > 13 13  --    BUN 21 19 19   < > 22 17  --    CREATININE 1.04 0.93 0.96   < > 0.89 0.87  --    EGFR 79 >90  --   --   --   --   --    MG  --   --   --   --  2.20 2.20 2.60*    < > = values in this interval not displayed.     Recent Labs     " 12/18/24  1045 11/19/24  0702 09/21/23  0716 06/20/20  0805 12/09/19  0358 12/08/19  0446   ALBUMIN 4.7  --  4.3  --  3.6 3.8   ALKPHOS 61  --  51  --   --  51   ALT 60* 56* 34   < >  --  28   AST 35  --  29  --   --  20   BILITOT 0.9  --  0.5  --   --  0.7    < > = values in this interval not displayed.       LIPID PANEL -   Recent Labs     11/19/24  0702 09/21/23  0716 10/08/22  0809 01/15/22  0803   CHOL 123 112 112 100   LDLCALC 25  --   --   --    LDLF  --  38 35 31   HDL 35.4 38.3* 38.0* 40.0   TRIG 314* 179* 193* 147       Recent Labs     11/19/24  0702 06/03/24  1621 09/21/23  0716 06/21/19  0728 05/10/19  1414   BNP  --   --   --   --  65   HGBA1C 7.2* 6.9* 6.3*   < >  --     < > = values in this interval not displayed.           Assessment/Plan   1) CAD: Status post CABG 2019.  No signs or symptoms to suggest progression of underlying coronary disease.  Based on the ISCHEMIA Trial no need for routine stress testing.     Consider repeat echocardiography after next visit.     2) dyslipidemia: LDL at goal of less than 55.  Continue the atorvastatin 80 mg daily.     3) hypertension: Blood pressure very well-controlled.  I told him that if he continues to lose weight and his blood pressure starts to drop we can always consider decreasing his lisinopril.     4) follow-up: 12 months or sooner if needed.       Lake iL MD

## 2025-03-04 ENCOUNTER — APPOINTMENT (OUTPATIENT)
Dept: CARDIOLOGY | Facility: CLINIC | Age: 65
End: 2025-03-04
Payer: COMMERCIAL

## 2025-03-04 VITALS
BODY MASS INDEX: 34.5 KG/M2 | HEART RATE: 72 BPM | WEIGHT: 241 LBS | SYSTOLIC BLOOD PRESSURE: 118 MMHG | HEIGHT: 70 IN | OXYGEN SATURATION: 97 % | DIASTOLIC BLOOD PRESSURE: 72 MMHG

## 2025-03-04 DIAGNOSIS — E78.5 DYSLIPIDEMIA, GOAL LDL BELOW 70: ICD-10-CM

## 2025-03-04 DIAGNOSIS — I25.10 CORONARY ARTERY DISEASE INVOLVING NATIVE CORONARY ARTERY OF NATIVE HEART WITHOUT ANGINA PECTORIS: Primary | ICD-10-CM

## 2025-03-04 DIAGNOSIS — I10 ESSENTIAL HYPERTENSION WITH GOAL BLOOD PRESSURE LESS THAN 130/85: ICD-10-CM

## 2025-03-04 PROCEDURE — 99214 OFFICE O/P EST MOD 30 MIN: CPT | Mod: 25 | Performed by: INTERNAL MEDICINE

## 2025-03-04 PROCEDURE — 3078F DIAST BP <80 MM HG: CPT | Performed by: INTERNAL MEDICINE

## 2025-03-04 PROCEDURE — 4010F ACE/ARB THERAPY RXD/TAKEN: CPT | Performed by: INTERNAL MEDICINE

## 2025-03-04 PROCEDURE — 93010 ELECTROCARDIOGRAM REPORT: CPT | Performed by: INTERNAL MEDICINE

## 2025-03-04 PROCEDURE — 1160F RVW MEDS BY RX/DR IN RCRD: CPT | Performed by: INTERNAL MEDICINE

## 2025-03-04 PROCEDURE — 1123F ACP DISCUSS/DSCN MKR DOCD: CPT | Performed by: INTERNAL MEDICINE

## 2025-03-04 PROCEDURE — 3074F SYST BP LT 130 MM HG: CPT | Performed by: INTERNAL MEDICINE

## 2025-03-04 PROCEDURE — 1036F TOBACCO NON-USER: CPT | Performed by: INTERNAL MEDICINE

## 2025-03-04 PROCEDURE — 1159F MED LIST DOCD IN RCRD: CPT | Performed by: INTERNAL MEDICINE

## 2025-03-04 PROCEDURE — 93005 ELECTROCARDIOGRAM TRACING: CPT | Performed by: INTERNAL MEDICINE

## 2025-03-04 PROCEDURE — 99214 OFFICE O/P EST MOD 30 MIN: CPT | Performed by: INTERNAL MEDICINE

## 2025-03-04 PROCEDURE — 3008F BODY MASS INDEX DOCD: CPT | Performed by: INTERNAL MEDICINE

## 2025-03-11 NOTE — PROGRESS NOTES
Clinical Pharmacy Appointment    Patient ID: Sal Kruger is a 64 y.o. male who presents for Diabetes.    Pt is here for Follow Up appointment.     Referring Provider: Lake Sage MD  PCP: Lake Sage MD   Last visit with PCP: 11/13/2024   Next visit with PCP: 05/29/2025    Subjective     Patient was referred to the pharmacy team for assistance with medication management of his diabetes and obesity. On 11/19/24 patient's A1c increased up to 7.2%.     At last pharmacy encounter Mounjaro was titrated up to 5 mg once weekly for additional blood sugar lowering. We are following-up today to see how he has tolerated the dose increase.     HPI  DIABETES MELLITUS TYPE 2:    Diagnosed with diabetes: ~4-5 years per patient. Known diabetic complications: CAD, obesity.  Does patient follow with Endocrinology: No  Last optometry exam: November 2024  Most recent visit in Podiatry: does not follow with podiatry -- patient denies sores or cuts on feet today      Current diabetic medications include:  Glimepiride 2 mg once daily with dinner  Farxiga 5 mg once daily   Metformin 1,000 mg twice daily   Mounjaro 5 mg once weekly (Thursdays)  Has 2 pens left at home     Historical diabetic medications include:   Glimepiride 2 mg (stopped when patient was started on Farxiga)    Adverse Effects:   Nausea has improved since continuing on the medication  Does notice some mild constipation but states it has been manageable so far, has not had to use OTC medications to help     Glucose Readings:  Glucometer/CGM Type: OneTouch Verio Glucometer   Patient tests BG a couple times per week typically fasting in the morning     Current home BG readings:   The last 5-6 readings have been all  in the 80s-100s aside from one reading in the 60s     Previous home BG readings:   Ranged from 100s-130s fasting     Any episodes of hypoglycemia? Yes, did have an episode of 63 mg/dL, denies any symptoms   Does pt have proteinuria? Unknown, no  completed lab for UACR in chart     Lifestyle:  Diet: 3 meals/day.   Notes eating less with the Mounjaro due to appetite suppression   Breakfast: eggs, turkey sausage, sometimes toast or an english muffin and black coffee   Lunch: leftover from the night before, chicken salad, tuna salad   Dinner: all meals are cooked at home, tries to get a vegetable in with meals   Snacks: does snack in the afternoon - piece of fruit, will have pretzels at night   Drinks: water, Gatorade Zero   Physical Activity:   None currently   Has been taking care of his wife so hasn't been able to leave the house much  Now that she is better he wants to try working activity in     Secondary Prevention:  Statin? Yes, Atorvastatin 80 mg daily   ACE-I/ARB? Yes, Lisinopril 40 mg daily   Aspirin? Yes, 81 mg daily     Pertinent PMH Review:  PMH of Pancreatitis: No  PMH of Retinopathy: No  PMH of MTC/MEN 2: No    Immunizations:  Influenza? 11/13/24  COVID? 01/17/22  Pneumonia? PCV20 06/03/24, PPSV23 05/12/17  Shingles? 06/01/20, 09/01/20  RSV: None - states Dr. Sage discussed with him about getting     Drug Interactions  No relevant drug interactions were noted.    Medication System Management  Adherence/Organization: does not typically forget his medications, does use a pill box to help with organization   Affordability/Accessibility: no issues reported today     Patient's preferred pharmacy:     [facility] [address] [place] [id number]  Phone: [phone] Fax: [phone]    [facility] - [place], [place] - [address] [facility]  [address] [facility] [address] [facility] [address]  Phone: [phone] Fax: [phone]      Objective   No Known Allergies  Social History     Social History Narrative    Not on file      Medication Reconciliation:  No changes made today     Medication Review  Current Outpatient Medications   Medication Instructions    acetaminophen 500 mg capsule  "Take by mouth.    aspirin 81 mg, 2 times daily    atorvastatin (LIPITOR) 80 mg, oral, Daily    blood sugar diagnostic (OneTouch Verio test strips) strip Test once daily    cholecalciferol (Vitamin D-3) 50 mcg (2,000 unit) capsule Take by mouth.    dapagliflozin propanediol (FARXIGA) 5 mg, oral, Daily    glimepiride (AMARYL) 2 mg, Daily    lancets misc Once daily    lisinopril 40 mg, oral, Daily    metFORMIN (GLUCOPHAGE) 1,000 mg, oral, 2 times daily (morning and late afternoon)    metoprolol tartrate (LOPRESSOR) 25 mg, oral, 2 times daily    Mounjaro 7.5 mg, subcutaneous, Weekly    OneTouch Delica Plus Lancet 30 gauge misc Daily      Vitals  BP Readings from Last 2 Encounters:   03/04/25 118/72   02/06/25 158/66     BMI Readings from Last 1 Encounters:   03/04/25 34.58 kg/m²      Labs  A1C  Lab Results   Component Value Date    HGBA1C 7.2 (H) 11/19/2024    HGBA1C 6.9 (H) 06/03/2024    HGBA1C 6.3 (A) 09/21/2023     BMP  Lab Results   Component Value Date    CALCIUM 9.9 11/19/2024     11/19/2024    K 4.8 11/19/2024    CO2 28 11/19/2024     11/19/2024    BUN 21 11/19/2024    CREATININE 1.04 11/19/2024    EGFR 79 11/19/2024     LFTs  Lab Results   Component Value Date    ALT 60 (H) 12/18/2024    AST 35 12/18/2024    ALKPHOS 61 12/18/2024    BILITOT 0.9 12/18/2024     FLP  Lab Results   Component Value Date    TRIG 314 (H) 11/19/2024    CHOL 123 11/19/2024    LDLF 38 09/21/2023    LDLCALC 25 11/19/2024    HDL 35.4 11/19/2024     Urine Microalbumin  No results found for: \"MICROALBCREA\"    Weight Management  Wt Readings from Last 3 Encounters:   03/04/25 109 kg (241 lb)   02/06/25 109 kg (240 lb)   01/09/25 111 kg (245 lb)      Estimated body mass index is 34.58 kg/m² as calculated from the following:    Height as of 3/4/25: 1.778 m (5' 10\").    Weight as of 3/4/25: 109 kg (241 lb).      Assessment/Plan   Problem List Items Addressed This Visit       Obesity    Relevant Medications    tirzepatide (Mounjaro) 7.5 " mg/0.5 mL pen injector    Other Relevant Orders    Referral to Clinical Pharmacy    Type 2 diabetes mellitus without complication, without long-term current use of insulin (Multi)     Patient's goal A1c is < 7%.  Is pt at goal? No, most recent A1c was 7.2% on 11/19/24 which had increased from 6.9% on 06/03/24.   Patient's SMBGs have improved and have been ranging typically in the 80s-100s fasting        Rationale for plan:   Patient has tolerated the increased dose of Mounjaro generally well, notes improvement in nausea. Has had some mild constipation but it has been manageable without the need for OTC laxatives.   Discussed that he should continue Farxiga for now, if his A1c significantly improves we can discuss reducing therapy at that time.   Patient has still been taking glimepiride, instructed him to stop this as it is no longer needed and it will help avoid hypoglycemia   Is agreeable to titrating up his Mounjaro to 7.5 mg once weekly     Medication Changes:  CONTINUE  Metformin 1,000 mg 1 tablet by mouth twice daily   Farxiga 5 mg 1 tablet by mouth once daily   INCREASE  Mounjaro 7.5 mg under the skin once weekly (increased from 5 mg)  Has 2 doses left at home for tomorrow 03/13 and 03/20  Will increase up to 7.5 mg effective with dose on 03/27  STOP  Glimepiride 2 mg     Future Considerations:  If patient tolerates the increased dose of Mounjaro well and would like to continue titrating up for weight loss effects could increase up to 10 mg once weekly at next follow-up    Monitoring and Education:  Will continue to monitor for new/worsening side effects to ensure patient is tolerating Mounjaro well with dose increase and that constipation is not worsening   Patient will continue checking blood sugar at home and we will go over updated readings at next encounter   Patient informed that if he does need OTC laxatives to help with constipation Miralax typically does well with constipation from GLP-1 agonists and  it should be fairly gentle compared to stimulant laxatives    We will plan to follow-up in 4 weeks to see how patient has done with the increased dose of Mounjaro. He was encouraged to reach out with any questions and/or concerns prior to then.          Relevant Medications    dapagliflozin propanediol (Farxiga) 5 mg    tirzepatide (Mounjaro) 7.5 mg/0.5 mL pen injector    Other Relevant Orders    Referral to Clinical Pharmacy     Pharmacy Follow-Up: 04/09/2025   PCP Follow-Up: 05/29/2025    Continue all meds under the continuation of care with the referring provider and clinical pharmacy team.    Thank you,    Freedom Smith, PharmD   Clinical Pharmacist    Verbal consent to manage patient's drug therapy was obtained from the patient. They were informed they may decline to participate or withdraw from participation in pharmacy services at any time.

## 2025-03-12 ENCOUNTER — APPOINTMENT (OUTPATIENT)
Dept: PHARMACY | Facility: HOSPITAL | Age: 65
End: 2025-03-12
Payer: COMMERCIAL

## 2025-03-12 DIAGNOSIS — E11.9 TYPE 2 DIABETES MELLITUS WITHOUT COMPLICATION, WITHOUT LONG-TERM CURRENT USE OF INSULIN (MULTI): ICD-10-CM

## 2025-03-12 DIAGNOSIS — E66.812 CLASS 2 SEVERE OBESITY WITH SERIOUS COMORBIDITY AND BODY MASS INDEX (BMI) OF 36.0 TO 36.9 IN ADULT, UNSPECIFIED OBESITY TYPE: ICD-10-CM

## 2025-03-12 DIAGNOSIS — E66.01 CLASS 2 SEVERE OBESITY WITH SERIOUS COMORBIDITY AND BODY MASS INDEX (BMI) OF 36.0 TO 36.9 IN ADULT, UNSPECIFIED OBESITY TYPE: ICD-10-CM

## 2025-03-12 RX ORDER — DAPAGLIFLOZIN 5 MG/1
5 TABLET, FILM COATED ORAL DAILY
Qty: 30 TABLET | Refills: 3 | Status: SHIPPED | OUTPATIENT
Start: 2025-03-12

## 2025-03-12 RX ORDER — GLIMEPIRIDE 2 MG/1
2 TABLET ORAL DAILY
COMMUNITY
Start: 2024-11-29 | End: 2025-03-12 | Stop reason: ALTCHOICE

## 2025-03-12 RX ORDER — TIRZEPATIDE 7.5 MG/.5ML
7.5 INJECTION, SOLUTION SUBCUTANEOUS WEEKLY
Qty: 2 ML | Refills: 0 | Status: SHIPPED | OUTPATIENT
Start: 2025-03-12

## 2025-03-12 NOTE — ASSESSMENT & PLAN NOTE
Patient's goal A1c is < 7%.  Is pt at goal? No, most recent A1c was 7.2% on 11/19/24 which had increased from 6.9% on 06/03/24.   Patient's SMBGs have improved and have been ranging typically in the 80s-100s fasting        Rationale for plan:   Patient has tolerated the increased dose of Mounjaro generally well, notes improvement in nausea. Has had some mild constipation but it has been manageable without the need for OTC laxatives.   Discussed that he should continue Farxiga for now, if his A1c significantly improves we can discuss reducing therapy at that time.   Patient has still been taking glimepiride, instructed him to stop this as it is no longer needed and it will help avoid hypoglycemia   Is agreeable to titrating up his Mounjaro to 7.5 mg once weekly     Medication Changes:  CONTINUE  Metformin 1,000 mg 1 tablet by mouth twice daily   Farxiga 5 mg 1 tablet by mouth once daily   INCREASE  Mounjaro 7.5 mg under the skin once weekly (increased from 5 mg)  Has 2 doses left at home for tomorrow 03/13 and 03/20  Will increase up to 7.5 mg effective with dose on 03/27  STOP  Glimepiride 2 mg     Future Considerations:  If patient tolerates the increased dose of Mounjaro well and would like to continue titrating up for weight loss effects could increase up to 10 mg once weekly at next follow-up    Monitoring and Education:  Will continue to monitor for new/worsening side effects to ensure patient is tolerating Mounjaro well with dose increase and that constipation is not worsening   Patient will continue checking blood sugar at home and we will go over updated readings at next encounter   Patient informed that if he does need OTC laxatives to help with constipation Miralax typically does well with constipation from GLP-1 agonists and it should be fairly gentle compared to stimulant laxatives    We will plan to follow-up in 4 weeks to see how patient has done with the increased dose of Mounjaro. He was encouraged  to reach out with any questions and/or concerns prior to then.

## 2025-03-13 ENCOUNTER — HOSPITAL ENCOUNTER (OUTPATIENT)
Dept: RADIOLOGY | Facility: EXTERNAL LOCATION | Age: 65
Discharge: HOME | End: 2025-03-13

## 2025-03-13 ENCOUNTER — OFFICE VISIT (OUTPATIENT)
Dept: ORTHOPEDIC SURGERY | Facility: CLINIC | Age: 65
End: 2025-03-13
Payer: COMMERCIAL

## 2025-03-13 DIAGNOSIS — M17.11 PRIMARY OSTEOARTHRITIS OF RIGHT KNEE: Primary | ICD-10-CM

## 2025-03-13 PROCEDURE — 99214 OFFICE O/P EST MOD 30 MIN: CPT | Mod: 25 | Performed by: FAMILY MEDICINE

## 2025-03-13 PROCEDURE — 4010F ACE/ARB THERAPY RXD/TAKEN: CPT | Performed by: FAMILY MEDICINE

## 2025-03-13 PROCEDURE — 99214 OFFICE O/P EST MOD 30 MIN: CPT | Performed by: FAMILY MEDICINE

## 2025-03-13 PROCEDURE — 1123F ACP DISCUSS/DSCN MKR DOCD: CPT | Performed by: FAMILY MEDICINE

## 2025-03-13 PROCEDURE — 2500000004 HC RX 250 GENERAL PHARMACY W/ HCPCS (ALT 636 FOR OP/ED): Performed by: FAMILY MEDICINE

## 2025-03-13 PROCEDURE — 20611 DRAIN/INJ JOINT/BURSA W/US: CPT | Mod: RT | Performed by: FAMILY MEDICINE

## 2025-03-13 RX ORDER — LIDOCAINE HYDROCHLORIDE 10 MG/ML
4 INJECTION, SOLUTION INFILTRATION; PERINEURAL
Status: COMPLETED | OUTPATIENT
Start: 2025-03-13 | End: 2025-03-13

## 2025-03-13 RX ORDER — ROPIVACAINE HYDROCHLORIDE 5 MG/ML
4 INJECTION, SOLUTION EPIDURAL; INFILTRATION; PERINEURAL
Status: COMPLETED | OUTPATIENT
Start: 2025-03-13 | End: 2025-03-13

## 2025-03-13 RX ORDER — METHYLPREDNISOLONE ACETATE 40 MG/ML
80 INJECTION, SUSPENSION INTRA-ARTICULAR; INTRALESIONAL; INTRAMUSCULAR; SOFT TISSUE
Status: COMPLETED | OUTPATIENT
Start: 2025-03-13 | End: 2025-03-13

## 2025-03-13 RX ADMIN — LIDOCAINE HYDROCHLORIDE 4 ML: 10 INJECTION, SOLUTION INFILTRATION; PERINEURAL at 14:25

## 2025-03-13 RX ADMIN — METHYLPREDNISOLONE ACETATE 80 MG: 40 INJECTION, SUSPENSION INTRA-ARTICULAR; INTRALESIONAL; INTRAMUSCULAR; INTRASYNOVIAL; SOFT TISSUE at 14:25

## 2025-03-13 RX ADMIN — ROPIVACAINE HYDROCHLORIDE 4 ML: 5 INJECTION EPIDURAL; INFILTRATION; PERINEURAL at 14:25

## 2025-03-13 NOTE — PROGRESS NOTES
Patient ID: Sal Kruger is a 64 y.o. male.    L Inj/Asp: R knee on 3/13/2025 2:25 PM  Indications: pain  Details: 22 G needle, ultrasound-guided superolateral approach  Medications: 4 mL lidocaine 10 mg/mL (1 %); 80 mg methylPREDNISolone acetate 40 mg/mL; 4 mL ropivacaine 5 mg/mL (0.5 %)  Outcome: tolerated well, no immediate complications  Procedure, treatment alternatives, risks and benefits explained, specific risks discussed. Consent was given by the patient. Immediately prior to procedure a time out was called to verify the correct patient, procedure, equipment, support staff and site/side marked as required. Patient was prepped and draped in the usual sterile fashion.       Sports Medicine Office Note    Today's Date: 3/13/2025     HPI: Sal Kruger is a 64 y.o. recently retired  who presents today for follow-up of chronic right knee pain with DJD.    He has received multiple, serial cortisone injections for his chronic knee DJD. It gives him great relief for up 12 months the last several times. His most recent injection on 02/2020 gave him great relief for over 1 year. His pain has gradually returned over the past 2-3 weeks. He denies injury or trauma. Today, he has dull deep right knee pain and denies injury or trauma. He takes occasional ibuprofen which has been less effective recently. He denies other treatments. He continues to work with his brace. He has no new complaints. He is requesting repeat cortisone injection for analgesia. We agreed upon repeating the cortisone injection into his right knee which he tolerated well. He will take prescription acetaminophen. He can continue working in the knee brace. Activity modifications were reviewed. He understands that he will ultimately need knee replacement but we will try to hold this off as long as possible.      Of note, he had an MI in May 2021 and a second one in June 2021 that required cardiac resuscitation. This led to 6  stents that eventually clotted off and he had triple bypass in December at [facility]. He has recovered very well.     1/12/22, chronic right knee DJD, repeat CSI    9/8/2022, chronic right knee DJD, repeat CSI    9/26/2022, chronic right knee pain with DJD, good benefit from CSI & wants to replace a orthopedic knee brace; custom made DJO climaflex OA lateral     3/16/2023, chronic right knee DJD, repeat CSI     10/5/2023, he returns for 7-month follow-up of chronic right knee pain with DJD.  He is requesting repeat cortisone injection for long-term analgesia.  The previous injection given on 3/16/2023 recently started to wear off over the past couple of weeks.  He denies interval injury or trauma.  We agreed upon repeating the cortisone injection into his right knee which he tolerated well. He will take prescription acetaminophen. He can continue with his knee brace. Activity modifications were reviewed. He understands that he will ultimately need knee replacement but we will try to hold this off as long as possible. He will followup in 3-6 months or sooner for recheck.     12/5/2024, he returns for follow-up of his chronic right knee pain with degenerative joint disease and requesting repeat cortisone injection for long-term analgesia.  He had adequate relief from the previous injection on 3/16/2023. He denies any new falls or trauma.  Has not had any swelling or overlying erythema.  We agreed that since patient has been having prolonged relief with corticosteroid injections we could offer him another injection today.  Injection completed and patient tolerated this well.  We will have him follow-up as needed.    Today, 3/13/2025, he returns for follow-up of his chronic right knee pain with degenerative joint disease and requesting repeat cortisone injection for long-term analgesia.  He had 2 months of near 100% relief from the previous injection on 12/5/2024, but after 2 months it began to wear off. He denies any  new falls or trauma.  Has not had any swelling or overlying erythema.  He would like to repeat a corticosteroid injection today.     He has no other complaints.     Physical Examination:     The RIGHT knee is without obvious signs of acute bony deformity, swelling, erythema, or ecchymosis. There is trace to grade 1 effusion. The patella is without tenderness. Apprehension is negative with medial and lateral glide. Patella crepitus and patella grind are positive. The medial joint line is mildly tender and without bony crepitus or step-off. The lateral joint line is mildly tender and without bony crepitus or step-off. Flexion & extension are full and symmetrical. There is no instability with stress testing.     The LEFT knee has trace to grade 1 joint effusion. Patella crepitus and grind are positive. There is minimal tenderness to the medial and lateral joint lines. Flexion and extension are without mechanical blocking. There is no instability with stress testing.   Skin - no rashes, sores, or open lesions. Strength, sensory and vascular exams are otherwise normal. There is no clubbing, cyanosis or edema.  Gait is slightly antalgic and tandem.    Imaging:  Radiographs of the right knee were reviewed and revealed significant tricompartmental osteoarthritis medial and patellofemoral joint spaces greater than lateral joint space..  The studies were reviewed by me personally in the office today.    Procedure Note:  After consent was obtained, the RIGHT knee was prepped in a sterile fashion. Ultrasound guidance was used to help insure proper needle placement into the joint, decrease patient discomfort, and decrease collateral damage. The knee joint was visualized and Depo-Medrol 80 mg with lidocaine 4 mL & ropivacaine 4 mL were injected without any complications. Ultrasound images were saved on an internal file for later reference. The patient tolerated the procedure well and the area was cleaned and  bandaged.    Procedure Note Attestation   Procedure performed by my sports medicine fellow.    I, Dr. Jaime Rice MD, supervised the entire procedure .    Assessment and Plan:     1. Primary osteoarthritis of right knee  Point of Care Ultrasound    L Inj/Asp: R knee        We reviewed the exam and x-ray findings and discussed the conservative and surgical treatment options. We agreed that since patient has been having prolonged relief with corticosteroid injections we could offer him another injection today, but with his diminishing timeframe of significant analgesia we will have him reach out to acquire preauthorization for viscosupplementation.  Encouraged him to continue using the 1000 mg of Tylenol as needed as well as his brace in the meantime.  Injection completed and patient tolerated this well.  We will have him follow-up preauthorization for viscosupplementation.    Most recent  Radiographs of the RIGHT knee were reviewed and revealed degenerative joint changes.  Patient has failed conservative management as outlined below:  Continued pain 6 weeks after last steroid injection. Last steroid injection done on 3/13/25  Continued pain after 6 weeks with anti-inflammatory and/or analgesic medication(s). Dosage and daily intake: Ibuprofen 600-800 mg three times daily (or alternative NSAID equivalent), Acetaminophen 1,000 mg three times daily, and Topical diclofenac applied three times daily  Continued pain after 6 weeks with physician-directed daily activity modification and/or physical therapy    Patient meets at least five (5) of the ACR clinical/laboratory criteria listed below:  Bony enlargement, Bony tenderness, Crepitus on active motion, < 30 minutes of morning stiffness, No palpable warmth of synovium, and > 50 years of age    Patient is NOT scheduled to undergo total knee replacement within 6 months of treatment.    Feliberto Willard DO  EM Sports Medicine Fellow     **This note was dictated using Dragon  speech recognition software and was not corrected for spelling or grammatical errors**.

## 2025-03-20 ENCOUNTER — TELEPHONE (OUTPATIENT)
Dept: PRIMARY CARE | Facility: CLINIC | Age: 65
End: 2025-03-20
Payer: COMMERCIAL

## 2025-03-20 NOTE — TELEPHONE ENCOUNTER
Spoke with patients wife. I do not see that we have a copy of the healthcare POA on file. She will get us a copy which I will scan in once received.

## 2025-03-20 NOTE — TELEPHONE ENCOUNTER
Wife called for pt- They would like to know if his power of  was scanned into chart. Pt had surgery awhile ago and thought the poa was filled out but they are not sure

## 2025-04-03 DIAGNOSIS — E11.9 TYPE 2 DIABETES MELLITUS WITHOUT COMPLICATION, WITHOUT LONG-TERM CURRENT USE OF INSULIN: Primary | ICD-10-CM

## 2025-04-03 RX ORDER — GLIMEPIRIDE 2 MG/1
2 TABLET ORAL
Qty: 90 TABLET | Refills: 3 | Status: SHIPPED | OUTPATIENT
Start: 2025-04-03 | End: 2026-03-29

## 2025-04-09 ENCOUNTER — APPOINTMENT (OUTPATIENT)
Dept: PHARMACY | Facility: HOSPITAL | Age: 65
End: 2025-04-09
Payer: COMMERCIAL

## 2025-04-09 DIAGNOSIS — E11.9 TYPE 2 DIABETES MELLITUS WITHOUT COMPLICATION, WITHOUT LONG-TERM CURRENT USE OF INSULIN: ICD-10-CM

## 2025-04-09 DIAGNOSIS — E66.01 CLASS 2 SEVERE OBESITY WITH SERIOUS COMORBIDITY AND BODY MASS INDEX (BMI) OF 36.0 TO 36.9 IN ADULT, UNSPECIFIED OBESITY TYPE: ICD-10-CM

## 2025-04-09 DIAGNOSIS — E66.812 CLASS 2 SEVERE OBESITY WITH SERIOUS COMORBIDITY AND BODY MASS INDEX (BMI) OF 36.0 TO 36.9 IN ADULT, UNSPECIFIED OBESITY TYPE: ICD-10-CM

## 2025-04-09 RX ORDER — TIRZEPATIDE 10 MG/.5ML
10 INJECTION, SOLUTION SUBCUTANEOUS WEEKLY
Qty: 2 ML | Refills: 0 | Status: SHIPPED | OUTPATIENT
Start: 2025-04-09

## 2025-04-09 NOTE — PROGRESS NOTES
Clinical Pharmacy Appointment    Patient ID: Sal Kruger is a 64 y.o. male who presents for Diabetes.    Pt is here for Follow Up appointment.     Referring Provider: Lake Sage MD  PCP: Lake Sage MD   Last visit with PCP: 11/13/2024   Next visit with PCP: 05/29/2025    Subjective     Patient was referred to the pharmacy team for assistance with medication management of his diabetes and obesity. On 11/19/24 patient's A1c increased up to 7.2%.     At last pharmacy encounter Mounjaro was titrated up to 7.5 mg once weekly for additional blood sugar lowering. His glimepiride was also stopped to avoid hypoglycemia and it was no longer needed based on reported fasting blood sugar readings. We are following-up today to see how he has tolerated the dose increase    HPI  DIABETES MELLITUS TYPE 2:    Diagnosed with diabetes: ~4-5 years per patient. Known diabetic complications: CAD, obesity.  Does patient follow with Endocrinology: No  Last optometry exam: November 2024  Most recent visit in Podiatry: does not follow with podiatry -- patient denies sores or cuts on feet today      Current diabetic medications include:  Farxiga 5 mg once daily   Metformin 1,000 mg twice daily   Mounjaro 7.5 mg once weekly (Thursdays)  Has 2 pens left at home     Historical diabetic medications include:   Glimepiride 2 mg (stopped when patient was started on Farxiga)    Adverse Effects:   Constipation has remained stable and manageable, has not had to use OTC laxatives yet to help      Glucose Readings:  Glucometer/CGM Type: OneTouch Verio Glucometer   Patient tests BG a couple times per week typically fasting in the morning     Current home BG readings:   Fasting in the 70s-80s, did have a 65 mg/dL last week      Previous home BG readings:   Fasting typically in the 80s-100s with one reading in the 60s    Any episodes of hypoglycemia? Yes, did have an episode of 65 mg/dL, denies any symptoms   Does pt have proteinuria?  Unknown, no completed lab for UACR in chart     Lifestyle:  Diet: 3 meals/day.   No changes reported from last encounter  Notes eating less with the Mounjaro due to appetite suppression   Breakfast: eggs, turkey sausage, sometimes toast or an english muffin and black coffee   Lunch: leftover from the night before, chicken salad, tuna salad   Dinner: all meals are cooked at home, tries to get a vegetable in with meals   Snacks: does snack in the afternoon - piece of fruit, will have pretzels at night   Drinks: water, Gatorade Zero   Physical Activity:   Recently got an exercise bike at home - is trying to do 15-20 minute sessions every other day     Secondary Prevention:  Statin? Yes, Atorvastatin 80 mg daily   ACE-I/ARB? Yes, Lisinopril 40 mg daily   Aspirin? Yes, 81 mg daily     Pertinent PMH Review:  PMH of Pancreatitis: No  PMH of Retinopathy: No  PMH of MTC/MEN 2: No    Immunizations:  Influenza? 11/13/24  COVID? 01/17/22  Pneumonia? PCV20 06/03/24, PPSV23 05/12/17  Shingles? 06/01/20, 09/01/20  RSV: None - states Dr. Sage discussed with him about getting     Drug Interactions  No relevant drug interactions were noted.    Medication System Management  Adherence/Organization: does not typically forget his medications, does use a pill box to help with organization   Affordability/Accessibility: no issues reported today     Patient's preferred pharmacy:     [facility] [address] - [address] [address]  Phone: [phone] Fax: [phone]    [facility] - [place], [place] - [address]  [address] [facility] [address]  Phone: [phone] Fax: [phone]      Objective   No Known Allergies  Social History     Social History Narrative    Not on file      Medication Reconciliation:  No changes made today     Medication Review  Current Outpatient Medications   Medication Instructions    acetaminophen 500 mg capsule Take by  "mouth.    aspirin 81 mg, 2 times daily    atorvastatin (LIPITOR) 80 mg, oral, Daily    blood sugar diagnostic (OneTouch Verio test strips) strip Test once daily    cholecalciferol (Vitamin D-3) 50 mcg (2,000 unit) capsule Take by mouth.    lancets misc Once daily    lisinopril 40 mg, oral, Daily    metFORMIN (GLUCOPHAGE) 1,000 mg, oral, 2 times daily (morning and late afternoon)    metoprolol tartrate (LOPRESSOR) 25 mg, oral, 2 times daily    Mounjaro 10 mg, subcutaneous, Weekly    OneTouch Delica Plus Lancet 30 gauge misc Daily      Vitals  BP Readings from Last 2 Encounters:   03/04/25 118/72   02/06/25 158/66     BMI Readings from Last 1 Encounters:   03/04/25 34.58 kg/m²      Labs  A1C  Lab Results   Component Value Date    HGBA1C 7.2 (H) 11/19/2024    HGBA1C 6.9 (H) 06/03/2024    HGBA1C 6.3 (A) 09/21/2023     BMP  Lab Results   Component Value Date    CALCIUM 9.9 11/19/2024     11/19/2024    K 4.8 11/19/2024    CO2 28 11/19/2024     11/19/2024    BUN 21 11/19/2024    CREATININE 1.04 11/19/2024    EGFR 79 11/19/2024     LFTs  Lab Results   Component Value Date    ALT 60 (H) 12/18/2024    AST 35 12/18/2024    ALKPHOS 61 12/18/2024    BILITOT 0.9 12/18/2024     FLP  Lab Results   Component Value Date    TRIG 314 (H) 11/19/2024    CHOL 123 11/19/2024    LDLF 38 09/21/2023    LDLCALC 25 11/19/2024    HDL 35.4 11/19/2024     Urine Microalbumin  No results found for: \"MICROALBCREA\"    Weight Management  Wt Readings from Last 3 Encounters:   03/04/25 109 kg (241 lb)   02/06/25 109 kg (240 lb)   01/09/25 111 kg (245 lb)      Estimated body mass index is 34.58 kg/m² as calculated from the following:    Height as of 3/4/25: 1.778 m (5' 10\").    Weight as of 3/4/25: 109 kg (241 lb).    Patient Reported Home Weights:   04/09/25: 230 lbs     Assessment/Plan   Problem List Items Addressed This Visit       Obesity    Relevant Medications    tirzepatide (Mounjaro) 10 mg/0.5 mL pen injector    Other Relevant Orders "    Referral to Clinical Pharmacy    Type 2 diabetes mellitus without complication, without long-term current use of insulin     Patient's goal A1c is < 7%.  Is pt at goal? No, most recent A1c was 7.2% on 11/19/24 which had increased from 6.9% on 06/03/24.   Patient's SMBGs have continued to improve and are typically in the 70s-80s fasting. Did note one episode of 65 mg/dL but denied symptoms of hypoglycemia.        Rationale for plan:   Patient has continued to tolerate Mounjaro well with the dose increase. Has continued to have some constipation but it has remained stable and has not had to use OTC laxatives.   With occasionally readings <70 mg/dL, will also stop Farxiga. Patient will be transitioning to Medicare in May and they will likely not require trial of SGLT2 inhibitor   Blood sugars are likely in good range, discussed continuing on the 7.5 mg dose vs titrating up to the 10 mg once weekly dose for additional weight loss benefits. Since he has tolerated the medication well so far, he would like to increase up to the 10 mg once weekly dose.     Medication Changes:  CONTINUE  Metformin 1,000 mg 1 tablet by mouth twice daily   STOP  Farxiga 5 mg 1 tablet by mouth once daily   INCREASE  Mounjaro 10 mg under the skin once weekly (increased from 7.5 mg)  Has 2 doses left at home for tomorrow 04/10 and 04/17  Will increase up to 10 mg effective with dose on 04/24    Future Considerations:  If patient tolerates the increased dose of Mounjaro well and would like to continue titrating up for weight loss effects could increase up to 12.5 mg once weekly at next follow-up if needed    Monitoring and Education:  Will continue to monitor for new/worsening side effects to ensure patient is tolerating Mounjaro well with dose increase and that constipation is not worsening   Patient will continue checking blood sugar at home and we will go over updated readings at next encounter   Updated A1c due anytime after 05/19/2025  (Medicare will cover every 6 months if previous was <8%)    We will plan to follow-up in 4 weeks to see how patient has done with the increased dose of Mounjaro. He was encouraged to reach out with any questions and/or concerns prior to then.          Relevant Medications    tirzepatide (Mounjaro) 10 mg/0.5 mL pen injector    Other Relevant Orders    Referral to Clinical Pharmacy     Pharmacy Follow-Up: 05/07/2025   PCP Follow-Up: 05/29/2025    Continue all meds under the continuation of care with the referring provider and clinical pharmacy team.    Thank you,    Freedom Smith, PharmD   Clinical Pharmacist    Verbal consent to manage patient's drug therapy was obtained from the patient. They were informed they may decline to participate or withdraw from participation in pharmacy services at any time.

## 2025-04-09 NOTE — ASSESSMENT & PLAN NOTE
Patient's goal A1c is < 7%.  Is pt at goal? No, most recent A1c was 7.2% on 11/19/24 which had increased from 6.9% on 06/03/24.   Patient's SMBGs have continued to improve and are typically in the 70s-80s fasting. Did note one episode of 65 mg/dL but denied symptoms of hypoglycemia.        Rationale for plan:   Patient has continued to tolerate Mounjaro well with the dose increase. Has continued to have some constipation but it has remained stable and has not had to use OTC laxatives.   With occasionally readings <70 mg/dL, will also stop Farxiga. Patient will be transitioning to Medicare in May and they will likely not require trial of SGLT2 inhibitor   Blood sugars are likely in good range, discussed continuing on the 7.5 mg dose vs titrating up to the 10 mg once weekly dose for additional weight loss benefits. Since he has tolerated the medication well so far, he would like to increase up to the 10 mg once weekly dose.     Medication Changes:  CONTINUE  Metformin 1,000 mg 1 tablet by mouth twice daily   STOP  Farxiga 5 mg 1 tablet by mouth once daily   INCREASE  Mounjaro 10 mg under the skin once weekly (increased from 7.5 mg)  Has 2 doses left at home for tomorrow 04/10 and 04/17  Will increase up to 10 mg effective with dose on 04/24    Future Considerations:  If patient tolerates the increased dose of Mounjaro well and would like to continue titrating up for weight loss effects could increase up to 12.5 mg once weekly at next follow-up if needed    Monitoring and Education:  Will continue to monitor for new/worsening side effects to ensure patient is tolerating Mounjaro well with dose increase and that constipation is not worsening   Patient will continue checking blood sugar at home and we will go over updated readings at next encounter   Updated A1c due anytime after 05/19/2025 (Medicare will cover every 6 months if previous was <8%)    We will plan to follow-up in 4 weeks to see how patient has done with  the increased dose of Mounjaro. He was encouraged to reach out with any questions and/or concerns prior to then.

## 2025-05-01 LAB
ANION GAP SERPL CALCULATED.4IONS-SCNC: 12 MMOL/L (CALC) (ref 7–17)
BUN SERPL-MCNC: 26 MG/DL (ref 7–25)
BUN/CREAT SERPL: 24 (CALC) (ref 6–22)
CALCIUM SERPL-MCNC: 9.7 MG/DL (ref 8.6–10.3)
CHLORIDE SERPL-SCNC: 104 MMOL/L (ref 98–110)
CO2 SERPL-SCNC: 24 MMOL/L (ref 20–32)
CREAT SERPL-MCNC: 1.09 MG/DL (ref 0.7–1.35)
EGFRCR SERPLBLD CKD-EPI 2021: 75 ML/MIN/1.73M2
EST. AVERAGE GLUCOSE BLD GHB EST-MCNC: 128 MG/DL
EST. AVERAGE GLUCOSE BLD GHB EST-SCNC: 7.1 MMOL/L
GLUCOSE SERPL-MCNC: 93 MG/DL (ref 65–139)
HBA1C MFR BLD: 6.1 %
POTASSIUM SERPL-SCNC: 4.4 MMOL/L (ref 3.5–5.3)
PSA SERPL-MCNC: 1.37 NG/ML
SODIUM SERPL-SCNC: 140 MMOL/L (ref 135–146)

## 2025-05-02 NOTE — RESULT ENCOUNTER NOTE
Results reviewed. No urgent findings.  Will Review results in detail at upcoming office appointment scheduled soon.      Lake Sage MD

## 2025-05-07 ENCOUNTER — APPOINTMENT (OUTPATIENT)
Dept: PHARMACY | Facility: HOSPITAL | Age: 65
End: 2025-05-07
Payer: MEDICARE

## 2025-05-07 DIAGNOSIS — E66.01 CLASS 2 SEVERE OBESITY WITH SERIOUS COMORBIDITY AND BODY MASS INDEX (BMI) OF 36.0 TO 36.9 IN ADULT, UNSPECIFIED OBESITY TYPE: ICD-10-CM

## 2025-05-07 DIAGNOSIS — E66.812 CLASS 2 SEVERE OBESITY WITH SERIOUS COMORBIDITY AND BODY MASS INDEX (BMI) OF 36.0 TO 36.9 IN ADULT, UNSPECIFIED OBESITY TYPE: ICD-10-CM

## 2025-05-07 DIAGNOSIS — E11.9 TYPE 2 DIABETES MELLITUS WITHOUT COMPLICATION, WITHOUT LONG-TERM CURRENT USE OF INSULIN: ICD-10-CM

## 2025-05-07 RX ORDER — TIRZEPATIDE 12.5 MG/.5ML
12.5 INJECTION, SOLUTION SUBCUTANEOUS WEEKLY
Qty: 2 ML | Refills: 0 | Status: SHIPPED | OUTPATIENT
Start: 2025-05-07

## 2025-05-07 NOTE — ASSESSMENT & PLAN NOTE
Patient's goal A1c is < 7%.  Is pt at goal? Yes, most recent A1c was 6.1% on 04/30/25 which had decreased from 7.2% on 11/19/24.   Patient's SMBGs have continued to improve and are typically in the 70s-80s fasting. Has had some occasional readings in the upper 60s but denies any signs/symptoms of hypoglycemia.        Rationale for plan:   Patient has continued to tolerate Mounjaro well with the dose increase. Has continued to have some mild constipation but it has remained stable and has not had to use OTC laxatives.   He does have new insurance with Medicare and unfortunately the cost is fairly high, he does think he meets criteria for our  Patient Assistance Program so we will work on this for him  Discussed continuing on the 10 mg dose vs increasing up to the 12.5 mg dose. Patient would like to titrate up for further weight loss effects.     Medication Changes:  CONTINUE  Metformin 1,000 mg 1 tablet by mouth twice daily   INCREASE  Mounjaro 12.5 mg under the skin once weekly (increased from 10 mg)  Has 2 doses left at home for tomorrow 05/08 and 05/15  Will increase up to 12.5 mg effective with dose on 05/22    Future Considerations:  If patient tolerates the increased dose of Mounjaro well and would like to continue titrating up for weight loss effects could increase up to 15 mg once weekly at next follow-up if needed  If experiencing good weight loss at the 12.5 mg dose could also continue him on it  Could potentially reduce his Metformin dose if next A1c is continuing to improve     Monitoring and Education:  Will continue to monitor for new/worsening side effects to ensure patient is tolerating Mounjaro well with dose increase and that constipation is not worsening   Patient will continue checking blood sugar at home and we will go over updated readings at next encounter   Updated A1c due anytime after 10/30/25 (every 6 months if well controlled)  Application for  Patient Assistance Program was submitted  today, pharmacy will inform patient if approved. I will reach out to patient if there are any issues with his application.      Patient Assistance Screening (VAF)  Patient verbally reports monthly or yearly income which is less than 400% federal poverty level  Application for program has been submitted for the following medications:   Mounjaro  Patient aware this process may take up to 2 weeks once income documents have been sent to the team.  If approved, medication must be filled through [facility] pharmacy and may be picked up or mailed to patient.   If approved, medication will be billed through insurance, and patient assistance team will pay the copay. This will result in a $0 copay for the patient.  Counseled patient on mechanism of action, side effects, contraindications, and what to do if the patient misses a dose. All patients questions were answered.     We will plan to follow-up in ~5 weeks to see how patient has done with the increased dose of Mounjaro. He was encouraged to reach out with any questions and/or concerns prior to then.

## 2025-05-07 NOTE — PROGRESS NOTES
Clinical Pharmacy Appointment    Patient ID: Sal Kruger is a 64 y.o. male who presents for Diabetes.    Pt is here for Follow Up appointment.     Referring Provider: Lake Sage MD  PCP: Lake Sage MD   Last visit with PCP: 11/13/2024   Next visit with PCP: 05/29/2025    Subjective     Patient was referred to the pharmacy team for assistance with medication management of his diabetes and obesity. On 11/19/24 patient's A1c increased up to 7.2%.     At last pharmacy encounter Mounjaro was titrated up to 10 mg once weekly for additional weight loss. He was also stopped on Farxiga as this should no longer be a requirement on his Medicare plan for Mounjaro coverage.     Patient switched to Medicare coverage starting 05/01/25.     We are following-up today to see how he has done with the dose increase and to discuss new insurance.     HPI  DIABETES MELLITUS TYPE 2:    Diagnosed with diabetes: ~4-5 years per patient. Known diabetic complications: CAD, obesity.  Does patient follow with Endocrinology: No  Last optometry exam: November 2024  Most recent visit in Podiatry: does not follow with podiatry -- patient denies sores or cuts on feet today      Current diabetic medications include:  Metformin 1,000 mg twice daily   Mounjaro 10 mg once weekly (Thursdays)  Has 2 pens left at home     Historical diabetic medications include:   Glimepiride 2 mg (stopped when patient was started on Farxiga)  Farxiga 5 mg (blood sugars well controlled with just Mounjaro and Metformin)    Adverse Effects:   Still notes some mild constipation but has been manageable, has not had to use any OTC laxatives yet to help      Glucose Readings:  Glucometer/CGM Type: OneTouch Verio Glucometer   Patient tests BG a couple times per week typically fasting in the morning     Current home BG readings:   Fasting still typically in the 70s-80s  May occasionally have the high 60s (65-69) but denies symptoms of hypoglycemia     Previous home  BG readings:   Fasting typically in the 70s-80s    Any episodes of hypoglycemia? Yes, did have an episode of 65 mg/dL, denies any symptoms   Does pt have proteinuria? Unknown, no completed lab for UACR in chart     Lifestyle:  Diet: 3 meals/day.   States smaller portion sizes with Mounjaro and generally trying to eat healthier    Breakfast: eggs, turkey sausage, sometimes toast or an english muffin and black coffee   Lunch: leftover from the night before, chicken salad, tuna salad   Dinner: all meals are cooked at home, tries to get a vegetable in with meals   Snacks: does snack in the afternoon - piece of fruit, will have pretzels at night   Drinks: water, Gatorade Zero   Physical Activity:   Uses the exercise bike and is trying to do ~20 minute sessions every other day     Secondary Prevention:  Statin? Yes, Atorvastatin 80 mg daily   ACE-I/ARB? Yes, Lisinopril 40 mg daily   Aspirin? Yes, 81 mg daily     Pertinent PMH Review:  PMH of Pancreatitis: No  PMH of Retinopathy: No  PMH of MTC/MEN 2: No    Immunizations:  Influenza? 11/13/24  COVID? 01/17/22  Pneumonia? PCV20 06/03/24, PPSV23 05/12/17  Shingles? 06/01/20, 09/01/20  RSV: None - states Dr. Sage discussed with him about getting     Drug Interactions  No relevant drug interactions were noted.    Medication System Management  Adherence/Organization: does not typically forget his medications, does use a pill box to help with organization   Affordability/Accessibility: no issues reported today     Patient's preferred pharmacy:     [id number] - [place] [id number] [address]  Phone: [phone] Fax: [phone]    [facility] - [place], [place] - [address]  [address] [address]  Phone: [phone] Fax: [phone]    [facility]  [address]  [facility] [address]  Phone: [phone] Fax: [phone]      Objective   No Known  "Allergies  Social History     Social History Narrative    Not on file      Medication Reconciliation:  No changes made today     Medication Review  Current Outpatient Medications   Medication Instructions    acetaminophen 500 mg capsule Take by mouth.    aspirin 81 mg, 2 times daily    atorvastatin (LIPITOR) 80 mg, oral, Daily    blood sugar diagnostic (OneTouch Verio test strips) strip Test once daily    cholecalciferol (Vitamin D-3) 50 mcg (2,000 unit) capsule Take by mouth.    lancets misc Once daily    lisinopril 40 mg, oral, Daily    metFORMIN (GLUCOPHAGE) 1,000 mg, oral, 2 times daily (morning and late afternoon)    metoprolol tartrate (LOPRESSOR) 25 mg, oral, 2 times daily    Mounjaro 12.5 mg, subcutaneous, Weekly    OneTouch Delica Plus Lancet 30 gauge misc Daily      Vitals  BP Readings from Last 2 Encounters:   03/04/25 118/72   02/06/25 158/66     BMI Readings from Last 1 Encounters:   03/04/25 34.58 kg/m²      Labs  A1C  Lab Results   Component Value Date    HGBA1C 6.1 (H) 04/30/2025    HGBA1C 7.2 (H) 11/19/2024    HGBA1C 6.9 (H) 06/03/2024     BMP  Lab Results   Component Value Date    CALCIUM 9.7 04/30/2025     04/30/2025    K 4.4 04/30/2025    CO2 24 04/30/2025     04/30/2025    BUN 26 (H) 04/30/2025    CREATININE 1.09 04/30/2025    EGFR 75 04/30/2025     LFTs  Lab Results   Component Value Date    ALT 60 (H) 12/18/2024    AST 35 12/18/2024    ALKPHOS 61 12/18/2024    BILITOT 0.9 12/18/2024     FLP  Lab Results   Component Value Date    TRIG 314 (H) 11/19/2024    CHOL 123 11/19/2024    LDLF 38 09/21/2023    LDLCALC 25 11/19/2024    HDL 35.4 11/19/2024     Urine Microalbumin  No results found for: \"MICROALBCREA\"    Weight Management  Wt Readings from Last 3 Encounters:   03/04/25 109 kg (241 lb)   02/06/25 109 kg (240 lb)   01/09/25 111 kg (245 lb)      Estimated body mass index is 34.58 kg/m² as calculated from the following:    Height as of 3/4/25: 1.778 m (5' 10\").    Weight as of " 3/4/25: 109 kg (241 lb).    Patient Reported Home Weights:   04/07/25: 230 lbs   05/06/25: 223 lbs     Assessment/Plan   Problem List Items Addressed This Visit       Obesity    Relevant Medications    tirzepatide (Mounjaro) 12.5 mg/0.5 mL pen injector    Other Relevant Orders    Referral to Clinical Pharmacy    Type 2 diabetes mellitus without complication, without long-term current use of insulin    Patient's goal A1c is < 7%.  Is pt at goal? Yes, most recent A1c was 6.1% on 04/30/25 which had decreased from 7.2% on 11/19/24.   Patient's SMBGs have continued to improve and are typically in the 70s-80s fasting. Has had some occasional readings in the upper 60s but denies any signs/symptoms of hypoglycemia.        Rationale for plan:   Patient has continued to tolerate Mounjaro well with the dose increase. Has continued to have some mild constipation but it has remained stable and has not had to use OTC laxatives.   He does have new insurance with Medicare and unfortunately the cost is fairly high, he does think he meets criteria for our  Patient Assistance Program so we will work on this for him  Discussed continuing on the 10 mg dose vs increasing up to the 12.5 mg dose. Patient would like to titrate up for further weight loss effects.     Medication Changes:  CONTINUE  Metformin 1,000 mg 1 tablet by mouth twice daily   INCREASE  Mounjaro 12.5 mg under the skin once weekly (increased from 10 mg)  Has 2 doses left at home for tomorrow 05/08 and 05/15  Will increase up to 12.5 mg effective with dose on 05/22    Future Considerations:  If patient tolerates the increased dose of Mounjaro well and would like to continue titrating up for weight loss effects could increase up to 15 mg once weekly at next follow-up if needed  If experiencing good weight loss at the 12.5 mg dose could also continue him on it  Could potentially reduce his Metformin dose if next A1c is continuing to improve     Monitoring and  Education:  Will continue to monitor for new/worsening side effects to ensure patient is tolerating Mounjaro well with dose increase and that constipation is not worsening   Patient will continue checking blood sugar at home and we will go over updated readings at next encounter   Updated A1c due anytime after 10/30/25 (every 6 months if well controlled)  Application for  Patient Assistance Program was submitted today, pharmacy will inform patient if approved. I will reach out to patient if there are any issues with his application.      Patient Assistance Screening (VAF)  Patient verbally reports monthly or yearly income which is less than 400% federal poverty level  Application for program has been submitted for the following medications:   Mounjaro  Patient aware this process may take up to 2 weeks once income documents have been sent to the team.  If approved, medication must be filled through [facility] and may be picked up or mailed to patient.   If approved, medication will be billed through insurance, and patient assistance team will pay the copay. This will result in a $0 copay for the patient.  Counseled patient on mechanism of action, side effects, contraindications, and what to do if the patient misses a dose. All patients questions were answered.     We will plan to follow-up in ~5 weeks to see how patient has done with the increased dose of Mounjaro. He was encouraged to reach out with any questions and/or concerns prior to then.          Relevant Medications    tirzepatide (Mounjaro) 12.5 mg/0.5 mL pen injector    Other Relevant Orders    Referral to Clinical Pharmacy     Pharmacy Follow-Up: 06/11/2025   PCP Follow-Up: 05/29/2025    Continue all meds under the continuation of care with the referring provider and clinical pharmacy team.    Thank you,    Freedom Smith, PharmD   Clinical Pharmacist    Verbal consent to manage patient's drug therapy was obtained from the patient. They  were informed they may decline to participate or withdraw from participation in pharmacy services at any time.

## 2025-05-12 PROCEDURE — RXMED WILLOW AMBULATORY MEDICATION CHARGE

## 2025-05-15 ENCOUNTER — PHARMACY VISIT (OUTPATIENT)
Dept: PHARMACY | Facility: CLINIC | Age: 65
End: 2025-05-15
Payer: COMMERCIAL

## 2025-05-19 DIAGNOSIS — I25.10 ATHEROSCLEROTIC HEART DISEASE OF NATIVE CORONARY ARTERY WITHOUT ANGINA PECTORIS: ICD-10-CM

## 2025-05-19 RX ORDER — METOPROLOL TARTRATE 25 MG/1
25 TABLET, FILM COATED ORAL 2 TIMES DAILY
Qty: 60 TABLET | Refills: 0 | Status: SHIPPED | OUTPATIENT
Start: 2025-05-19

## 2025-05-29 ENCOUNTER — APPOINTMENT (OUTPATIENT)
Dept: PRIMARY CARE | Facility: CLINIC | Age: 65
End: 2025-05-29
Payer: COMMERCIAL

## 2025-05-29 VITALS
SYSTOLIC BLOOD PRESSURE: 106 MMHG | WEIGHT: 219 LBS | OXYGEN SATURATION: 95 % | BODY MASS INDEX: 32.44 KG/M2 | DIASTOLIC BLOOD PRESSURE: 70 MMHG | HEIGHT: 69 IN | HEART RATE: 69 BPM

## 2025-05-29 DIAGNOSIS — Z00.00 ROUTINE GENERAL MEDICAL EXAMINATION AT HEALTH CARE FACILITY: Primary | ICD-10-CM

## 2025-05-29 DIAGNOSIS — I10 ESSENTIAL HYPERTENSION WITH GOAL BLOOD PRESSURE LESS THAN 130/85: ICD-10-CM

## 2025-05-29 DIAGNOSIS — E55.9 VITAMIN D DEFICIENCY: ICD-10-CM

## 2025-05-29 DIAGNOSIS — E66.811 CLASS 1 OBESITY WITH SERIOUS COMORBIDITY AND BODY MASS INDEX (BMI) OF 32.0 TO 32.9 IN ADULT, UNSPECIFIED OBESITY TYPE: ICD-10-CM

## 2025-05-29 DIAGNOSIS — Z95.1 S/P CABG (CORONARY ARTERY BYPASS GRAFT): ICD-10-CM

## 2025-05-29 DIAGNOSIS — Z97.3 WEARS GLASSES: ICD-10-CM

## 2025-05-29 DIAGNOSIS — I10 ESSENTIAL HYPERTENSION: ICD-10-CM

## 2025-05-29 DIAGNOSIS — Z97.2 WEARS DENTURES: ICD-10-CM

## 2025-05-29 DIAGNOSIS — E11.9 TYPE 2 DIABETES MELLITUS WITHOUT COMPLICATION, WITHOUT LONG-TERM CURRENT USE OF INSULIN: ICD-10-CM

## 2025-05-29 DIAGNOSIS — D12.6 TUBULAR ADENOMA OF COLON: ICD-10-CM

## 2025-05-29 DIAGNOSIS — E78.5 DYSLIPIDEMIA, GOAL LDL BELOW 70: ICD-10-CM

## 2025-05-29 DIAGNOSIS — E78.5 HYPERLIPIDEMIA, UNSPECIFIED HYPERLIPIDEMIA TYPE: ICD-10-CM

## 2025-05-29 DIAGNOSIS — I25.10 CORONARY ARTERY DISEASE INVOLVING NATIVE CORONARY ARTERY OF NATIVE HEART WITHOUT ANGINA PECTORIS: ICD-10-CM

## 2025-05-29 DIAGNOSIS — Z23 IMMUNIZATION DUE: ICD-10-CM

## 2025-05-29 PROBLEM — J32.9 SINUSITIS: Status: RESOLVED | Noted: 2023-05-12 | Resolved: 2025-05-29

## 2025-05-29 PROBLEM — E66.9 OBESITY: Status: RESOLVED | Noted: 2023-05-12 | Resolved: 2025-05-29

## 2025-05-29 RX ORDER — LISINOPRIL 40 MG/1
20 TABLET ORAL DAILY
Status: SHIPPED | COMMUNITY
Start: 2025-05-29

## 2025-05-29 ASSESSMENT — ACTIVITIES OF DAILY LIVING (ADL)
DRESSING: INDEPENDENT
BATHING: INDEPENDENT
GROCERY_SHOPPING: INDEPENDENT
PATIENT'S MEMORY ADEQUATE TO SAFELY COMPLETE DAILY ACTIVITIES?: YES
MANAGING_FINANCES: INDEPENDENT
TOILETING: INDEPENDENT
DOING_HOUSEWORK: INDEPENDENT
ASSISTIVE_DEVICE: EYEGLASSES;DENTURES LOWER;DENTURES UPPER
FEEDING YOURSELF: INDEPENDENT
GROOMING: INDEPENDENT
TAKING_MEDICATION: INDEPENDENT
ADEQUATE_TO_COMPLETE_ADL: YES
JUDGMENT_ADEQUATE_SAFELY_COMPLETE_DAILY_ACTIVITIES: YES

## 2025-05-29 ASSESSMENT — VISUAL ACUITY
OD_CC: 20/15
OS_CC: 20/15

## 2025-05-29 ASSESSMENT — PATIENT HEALTH QUESTIONNAIRE - PHQ9
2. FEELING DOWN, DEPRESSED OR HOPELESS: NOT AT ALL
SUM OF ALL RESPONSES TO PHQ9 QUESTIONS 1 AND 2: 0
1. LITTLE INTEREST OR PLEASURE IN DOING THINGS: NOT AT ALL

## 2025-05-29 NOTE — ASSESSMENT & PLAN NOTE
Orders:    Comprehensive Metabolic Panel; Future    Hemoglobin A1C; Future    Albumin-Creatinine Ratio, Urine Random; Future

## 2025-05-29 NOTE — PROGRESS NOTES
"Subjective   Reason for Visit: Sal Kruger is an 64 y.o. male here for a Medicare Wellness visit.     Past Medical, Surgical, and Family History reviewed and updated in chart.    Reviewed all medications by prescribing practitioner or clinical pharmacist (such as prescriptions, OTCs, herbal therapies and supplements) and documented in the medical record.    Here for follow up and wellness visit.  Overall doing well for the most part.    On mounjaro now 4 mo - wt down 35 lbs        Patient Care Team:  Lake Sage MD as PCP - General  Lake Sage MD as PCP - [facility] PCP     Review of Systems    Objective   Vitals:  /70   Pulse 69   Ht 1.743 m (5' 8.62\")   Wt 99.3 kg (219 lb)   SpO2 95%   BMI 32.70 kg/m²       Physical Exam  Vitals reviewed.   Constitutional:       Appearance: Normal appearance.   HENT:      Head: Normocephalic and atraumatic.      Right Ear: Tympanic membrane normal.      Left Ear: Tympanic membrane normal.   Eyes:      General: No scleral icterus.        Right eye: No discharge.         Left eye: No discharge.      Extraocular Movements: Extraocular movements intact.      Conjunctiva/sclera: Conjunctivae normal.      Pupils: Pupils are equal, round, and reactive to light.   Cardiovascular:      Rate and Rhythm: Normal rate and regular rhythm.      Pulses:           Dorsalis pedis pulses are 3+ on the right side and 3+ on the left side.        Posterior tibial pulses are 3+ on the right side and 3+ on the left side.      Heart sounds: Normal heart sounds. No murmur heard.  Pulmonary:      Effort: Pulmonary effort is normal.      Breath sounds: Normal breath sounds. No wheezing or rhonchi.   Musculoskeletal:         General: No deformity or signs of injury. Normal range of motion.      Cervical back: Normal range of motion and neck supple. No rigidity or tenderness.   Feet:      Right foot:      Protective Sensation: 10 sites tested.  10 sites sensed.      Skin integrity: Skin " integrity normal.      Toenail Condition: Right toenails are normal.      Left foot:      Protective Sensation: 10 sites tested.  10 sites sensed.      Skin integrity: Skin integrity normal.      Toenail Condition: Left toenails are normal.      Comments: On feet exam, without socks, pedal pulses palpable. No sensory neuropathy noted in either foot using monofilament. No worrisome calluses or amputations. Nail care. Adequate. No open ulcers noted on the feet or toes.  Lymphadenopathy:      Cervical: No cervical adenopathy.   Skin:     General: Skin is warm and dry.      Findings: No rash.   Neurological:      General: No focal deficit present.      Mental Status: He is alert and oriented to person, place, and time. Mental status is at baseline.      Cranial Nerves: No cranial nerve deficit.      Sensory: No sensory deficit.      Gait: Gait normal.   Psychiatric:         Mood and Affect: Mood normal.         Behavior: Behavior normal.         Thought Content: Thought content normal.         Judgment: Judgment normal.         Assessment & Plan  Coronary artery disease involving native coronary artery of native heart without angina pectoris         Dyslipidemia, goal LDL below 70    Orders:    Comprehensive Metabolic Panel; Future    Essential hypertension with goal blood pressure less than 130/85         S/P CABG (coronary artery bypass graft)         Wears dentures         Class 1 obesity with serious comorbidity and body mass index (BMI) of 32.0 to 32.9 in adult, unspecified obesity type         Wears glasses         Tubular adenoma of colon         Type 2 diabetes mellitus without complication, without long-term current use of insulin    Orders:    Comprehensive Metabolic Panel; Future    Hemoglobin A1C; Future    Albumin-Creatinine Ratio, Urine Random; Future    Vitamin D deficiency    Orders:    Vitamin D 25-Hydroxy,Total (for eval of Vitamin D levels); Future    Routine general medical examination at [facility]  facility    Orders:    1 Year Follow Up In [facility] - PCP - Wellness Exam; Future    Hyperlipidemia, unspecified hyperlipidemia type    Orders:    Lipid Panel; Future    Immunization due    Orders:    respiratory syncytial virus, RSV, vaccine, adjuvanted, age 60y+ (Arexvy) 120 mcg/0.5 mL suspension for reconstitution; Inject 0.5 mL into the muscle 1 time for 1 dose.    Essential hypertension                 Portions of this encounter note have been copied from my previous note dated 11/13/24  , which have been updated where appropriate and all reflect my current medical decision making from today.     Labs  reviewed from 5/1/2025    Living situation-he and his wife downsized to a condo in 2018. 1 level living, laundry on 1st floor     Caregiving concerns-his wife suffered a bleeding intracranial aneurysm near [place] 2023.  She has improved but will not be able to drive in the future.               6/24-fortunately 5 months out now her short-term memory has improved a bit              11/24-unfortunately his wife fell in 7/24, with a hip fracture,-and was readmitted to rehab for 8 weeks, discharged back to home early September 2024, just as he retired 9/3/2024.  She required one-on-one care for weeks.  Fortunately she is improving, now using a walker.  With baby monitors he is able to do more and more                 5/25-she is doing better fortunately    custodial-9/3/2024-as above, caregiving concerns have consumed his detention efforts so far.  Fortunately his wife is improved      CAD s/p 3vCABG Dec '19 - 4 cardiac stents placed May 2019 followed by 2 more stents after his cardiac arrest late June 2019 per Dr. Li clinically doing very well. Now back at work. He states that elected not to pursue cardiac rehabilitation.         He will see Dr. Li in cardiology annually. Medications reviewed. He remains quite compliant. Appt 2/29/24 5/25-no cardiac concerns.  Next appointment  with cardiologist scheduled March 2026      Status post V. fib cardiac arrest 6/27/19- amazingly he has done well following urgent catheterization. He saw Dr. Enriquez at that time but no longer follows up with him.     Hypertension/dyslipidemia- he will continue his meds. Goal LDL <70;   LDL = 35 Oct '22           11/23-LDL 38, BMI 35.  Blood pressure borderline elevated.  His home blood pressure machine has been checked and it correlates.  He will check this several times weekly and send an average in understanding that if the average is consistently over 130 he will need additional medication            6/24-blood pressure borderline-he will advance metoprolol.  Encouraged him to check his blood pressure after lunch or after dinner and call if the average is not consistently under 130              11/24-blood pressure improved on recheck             5/25-blood pressure has dropped a lot with weight loss-he will reduce lisinopril 1/2 tablet twice daily and remain on metoprolol.  Fasting lipids ordered for next time       Diabetes/BMI over 30- we spoke at length in the past regarding ways to optimize his coverage. Home glucose values appear good so far and somewhat improved. Tolerating metformin           May '23; A1c slightly increased towards 6.4%.  Considering his BMI, discussed semaglutide.  He will meet with our pharmacy team to see if this might be an option             11/23-A1c 6.3%.  BMI 35.  He will continue his medication option and will reassess labs in 4 months.  Weight loss remains a primary goal he understands              6/24-BMI 34.5 Labs to be done this [id number] 6.9%.  In light of his challenging year now caring for his wife, this understandably has worsened.  He will optimize this and will reassess before follow-up            11/24-with [facility] and caring for his wife, his weight is up 9 pounds.  He will continue labs and recheck A1c before follow-up             5/25-he has  been on Mounjaro about 4 months, weight down 35 pounds.  He will continue efforts as he follows up with the pharmacy team with [facility].    A1c: 6.1% 5/1/2025  Foot Exam:   5/29/2025  Eye Exam:     Statin:  Yes  Lipid Panel: (goal LDL <70) lipids ordered 5/25    Urine Albumin:       Ordered 5/25    Influenza Immunization: Each fall  Pneumonia Immunization:  UTD        exercise routine-though he works full-time as an  is not exercising regularly. Suggested recumbent cycle and efforts to lose weight in the past. Cardiac rehabilitation occurred after his cardiac arrest but not after his CABG               Exercising less w/ caregiver responsibilities. He'll advance this as he can     Costochondritis/chest pain syndrome-since his heart bypass he is occasionally gets central substernal pain with position changes. Discussed rib anatomy. He has reviewed this with Dr. Li. This is clearly different from his angina which he has not had. He understands if he does experience any of the angina symptoms that he had before his cardiac arrest-he needs to call 911.              No recent concerns       Sleep apnea/history of pulmonary nodule- Negative pulm eval. RE nodule per Dr. Moore. He's tolerated the CPAP for some 15 yrs. He understands pulmonologist f/u is necessary for machine. encouraged him to do. He will see Dr. Mccall in March. He just got a new CPAP machine in 2020- doing better               He remains compliant w/ this     History of fatty liver/elevated LFTs- evaluated by Dr. Weber in the liver clinic in the past including a biopsy 8/16 -negative. Recent LFTs normal.  Will follow            5/25 - he did labs in 2/25 w/ liver specialist at [facility] - then Fibroscan was performed on 3/17/2025Diagnosis: Fatty liver. The reading was adequate, and corresponds to Fibrosis stage 2 and steatosis grade of 3.            He'll follow up w/ Liset Houston CNP at 6 month, and do an  ultrasound       Sciatica-right side-encourage Ana exercises optimizing sleep in sitting position and call if not improved understanding physical therapy would be in order. Right-sided sciatica continued-when he has done this Ana exercises this helps. Encouraged optimal position and posture particularly while sitting.              11/23-left sciatica an issue since driving home from [place] in a smaller rental car-a [place] earlier this fall.  He will optimize his position try to sleep in his bed on his back with his knees elevated Ana exercises encouraged and follow-up in PT if not improved               11/24-improved    Ganglion-right wrist-he is left-handed-he will follow this and notify me if this enlarges or changes or becomes painful     Bilateral carpal tunnel - both improved w/ splints nightly. he'll f/u w/ Dr. Mata w/ concerns. using splints nightly, with ongoing symptoms, right hand a bit worse than his left, though he is left-handed.              Occas noted now. Using splints nightly. He'll return to Dr. Mata when splints no longer working       Advanced Right knee DJD-  He will continue to see Dr. Rice for injections in his right knee which so far have helped. He also received a knee brace for greater stability. Right knee injected 9/22             Presently right knee doing OK     Left elbow pain - improved w/ splint              No present concerns     Bilateral inguinal hernias/ umbilical hernia-caution with lifting. We'll follow. Umbilical hernia not problematic     Adenomatous colon polyps-             Colonoscopy updated early May '23. Poor prep, and 1 adenomatous polyp. Next in 4-5 months w/ 2 day prep            10/23-colonoscopy completed-multiple polyps-next colonoscopy in 1 year-10/24             11/24-colonoscopy ordered             2/25 - colonoscopy done twice showed 30 mmTubulovillous adenoma  polyp.                       Next colonoscopy  at 6 months -        Diverticulosis-severe on 10/23 colonoscopy.  He is now on MiraLAX per GI     BPH- annual PSA continues for prostate cancer screening. Nocturia not a present concern.             PSA normal                  Vitamin D deficiency-he will advance this with his 2020 vitamin D on the low side still.        Dentures-he no longer follows in the dental clinic            -dentures were recently redone.  He will pick these up today     Bifocal/Glasses/vision care- patient will continue routine vision exams now annually with diabetes. New glasses early . His exams continue annually he [id number]                Annual eye exams encouraged     History of Basal cell cheek lesion excised by Mohs- he will continue visits in dermatology-- now as needed. He understands importance of sunscreen accordingly. He has some skin tags to remove, he'll see derm w/ concerns. encouraged sun screen     Nose lesion- Mohs surgery to address lesions. He will follow up as needed. No concerns presently.      Family concerns/bereavement-his younger brother  May 2023 after fall and head injury.  He was 60 years old, with MS.  He has another brother who is in [place] who he visited summer -he is not doing that well he notes              -his brother in [place] with MS fortunately is doing fairly well    long term-he was planning on prison at age 64 in Aug '24            He retired 9/3/2024 to help take care of his wife    Flu shot each fall-updated 2024     Covid series completed. Covid Booster completed.          Additional booster shot discussed / encouraged  /declined    RSV vacc suggested again -ordered     Prevnar 20 -updated 6/3/2024       Follow-up as scheduled every 4-5 months to review labs, sooner as needed     Charting was completed using voice recognition technology and may include unintended errors.

## 2025-06-04 ENCOUNTER — HOSPITAL ENCOUNTER (OUTPATIENT)
Dept: RADIOLOGY | Facility: CLINIC | Age: 65
Discharge: HOME | End: 2025-06-04
Payer: MEDICARE

## 2025-06-04 ENCOUNTER — OFFICE VISIT (OUTPATIENT)
Dept: PRIMARY CARE | Facility: CLINIC | Age: 65
End: 2025-06-04
Payer: MEDICARE

## 2025-06-04 VITALS
HEART RATE: 69 BPM | BODY MASS INDEX: 31.47 KG/M2 | TEMPERATURE: 97.3 F | WEIGHT: 219.8 LBS | SYSTOLIC BLOOD PRESSURE: 96 MMHG | DIASTOLIC BLOOD PRESSURE: 60 MMHG | HEIGHT: 70 IN | OXYGEN SATURATION: 95 % | RESPIRATION RATE: 16 BRPM

## 2025-06-04 DIAGNOSIS — M79.671 PAIN IN RIGHT FOOT: Primary | ICD-10-CM

## 2025-06-04 DIAGNOSIS — M79.671 PAIN IN RIGHT FOOT: ICD-10-CM

## 2025-06-04 PROCEDURE — 4010F ACE/ARB THERAPY RXD/TAKEN: CPT | Performed by: NURSE PRACTITIONER

## 2025-06-04 PROCEDURE — 1160F RVW MEDS BY RX/DR IN RCRD: CPT | Performed by: NURSE PRACTITIONER

## 2025-06-04 PROCEDURE — 73630 X-RAY EXAM OF FOOT: CPT | Mod: RT

## 2025-06-04 PROCEDURE — 3074F SYST BP LT 130 MM HG: CPT | Performed by: NURSE PRACTITIONER

## 2025-06-04 PROCEDURE — 3078F DIAST BP <80 MM HG: CPT | Performed by: NURSE PRACTITIONER

## 2025-06-04 PROCEDURE — 1159F MED LIST DOCD IN RCRD: CPT | Performed by: NURSE PRACTITIONER

## 2025-06-04 PROCEDURE — 73630 X-RAY EXAM OF FOOT: CPT | Mod: RIGHT SIDE | Performed by: RADIOLOGY

## 2025-06-04 PROCEDURE — 1125F AMNT PAIN NOTED PAIN PRSNT: CPT | Performed by: NURSE PRACTITIONER

## 2025-06-04 PROCEDURE — 1036F TOBACCO NON-USER: CPT | Performed by: NURSE PRACTITIONER

## 2025-06-04 PROCEDURE — 3008F BODY MASS INDEX DOCD: CPT | Performed by: NURSE PRACTITIONER

## 2025-06-04 PROCEDURE — 99213 OFFICE O/P EST LOW 20 MIN: CPT | Performed by: NURSE PRACTITIONER

## 2025-06-04 ASSESSMENT — ENCOUNTER SYMPTOMS
NUMBNESS: 1
ARTHRALGIAS: 1

## 2025-06-04 ASSESSMENT — PAIN SCALES - GENERAL: PAINLEVEL_OUTOF10: 5

## 2025-06-04 NOTE — PROGRESS NOTES
"Subjective   Patient ID: Sal Kruger is a 64 y.o. male who presents for Foot Pain (Foot pain increases with walking and by touch x 3 days with tingling).    HPI     Review of Systems    Objective   BP 96/60 (BP Location: Left arm, Patient Position: Sitting, BP Cuff Size: Adult)   Pulse 69   Temp 36.3 °C (97.3 °F) (Skin)   Resp 16   Ht 1.778 m (5' 10\")   Wt 99.7 kg (219 lb 12.8 oz)   SpO2 95%   BMI 31.54 kg/m²     Physical Exam    Assessment/Plan          "

## 2025-06-04 NOTE — PROGRESS NOTES
"Subjective   Patient ID: Sal Kruger is a 64 y.o. male who presents for Foot Pain (Foot pain increases with walking and by touch x 3 days with tingling).    2 days ago started having pain and tingling with swelling in the middle of the night. Doesn't recall any specific injury.    Tingling has subsided, but pain now worse with walking.   7/10 when walking. Rest, ibuprofen, ice improves pain.   Denies swelling, warmth, and redness up the leg.            Review of Systems   Musculoskeletal:  Positive for arthralgias.   Neurological:  Positive for numbness.   All other systems reviewed and are negative.      Objective   BP 96/60 (BP Location: Left arm, Patient Position: Sitting, BP Cuff Size: Adult)   Pulse 69   Temp 36.3 °C (97.3 °F) (Skin)   Resp 16   Ht 1.778 m (5' 10\")   Wt 99.7 kg (219 lb 12.8 oz)   SpO2 95%   BMI 31.54 kg/m²     Physical Exam  Constitutional:       Appearance: Normal appearance. He is normal weight.   Cardiovascular:      Rate and Rhythm: Normal rate and regular rhythm.      Heart sounds: Normal heart sounds.   Pulmonary:      Effort: Pulmonary effort is normal.      Breath sounds: Normal breath sounds.   Musculoskeletal:      Right foot: Swelling and tenderness present. No deformity.        Legs:    Skin:     General: Skin is warm and dry.      Findings: No abrasion, bruising or laceration.   Neurological:      Mental Status: He is alert.      Sensory: Sensory deficit (slight decrease in feeling on right.) present.      Motor: No weakness.   Psychiatric:         Mood and Affect: Mood normal.         Assessment/Plan   Diagnoses and all orders for this visit:  Pain in right foot  -     XR foot right 3+ views; Future  - Continue OTC pain meds, prefer Tylenol vs. Ibuprofen.   - Continue RICE protocol.   - Call or return to office prn if these symptoms worsen or fail to improve as anticipated.         "

## 2025-06-09 NOTE — PROGRESS NOTES
Clinical Pharmacy Appointment    Patient ID: Sal Kruger is a 64 y.o. male who presents for Diabetes.    Pt is here for Follow Up appointment.     Referring Provider: Lake Sage MD  PCP: Lake Sage MD   Last visit with PCP: 05/29/2025  Next visit with PCP: 12/04/2025    Subjective     Patient was referred to the pharmacy team for assistance with medication management of his diabetes and obesity. On 11/19/24 patient's A1c increased up to 7.2%.     At last pharmacy encounter, patient was generally tolerating the Mounjaro well at 10 mg once weekly, reporting some mild constipation but it had remained stable and he had not had to use any OTC laxatives. His dose was titrated up to 12.5 mg once weekly.     Since our last encounter he saw Dr. Sage on 05/29/25, he was doing well on the Mounjaro and updated A1c was 6.1% on 05/01/25. His lisinopril was reduced to 1/2 tablet daily.     We are following-up today to see how he has done with the Mounjaro dose increase.    HPI  DIABETES MELLITUS TYPE 2:    Diagnosed with diabetes: ~4-5 years per patient. Known diabetic complications: CAD, obesity.  Does patient follow with Endocrinology: No  Last optometry exam: November 2024  Most recent visit in Podiatry: does not follow with podiatry -- patient denies sores or cuts on feet today      Current diabetic medications include:  Metformin 1,000 mg twice daily   Mounjaro 12.5 mg once weekly (Thursdays)    Historical diabetic medications include:   Glimepiride 2 mg (stopped when patient was started on Farxiga)  Farxiga 5 mg (blood sugars well controlled with just Mounjaro and Metformin)    Adverse Effects:    Still notes some mild constipation but has been manageable and has not worsened, has not had to use any OTC laxatives yet to help   No other side effects reported     Glucose Readings:  Glucometer/CGM Type: OneTouch Verio Glucometer   Patient tests BG a couple times per week typically fasting in the morning      Current home BG readings:   Fasting typically in the 90s-100s     Previous home BG readings:   Fasting typically in the 70s-80s  May occasionally be in the high 60s but patient denies symptoms of hypoglycemia    Any episodes of hypoglycemia? Yes, did have an episode of 65 mg/dL, denies any symptoms   Does pt have proteinuria? Unknown, no completed lab for UACR in chart     Lifestyle:  No changes reported today   Diet: 3 meals/day.   States smaller portion sizes with Mounjaro and generally trying to eat healthier    Breakfast: eggs, turkey sausage, sometimes toast or an english muffin and black coffee   Lunch: leftover from the night before, chicken salad, tuna salad   Dinner: all meals are cooked at home, tries to get a vegetable in with meals   Snacks: does snack in the afternoon - piece of fruit, will have pretzels at night   Drinks: water, Gatorade Zero   Physical Activity:   Uses the exercise bike and is trying to do ~20 minute sessions every other day     Secondary Prevention:  Statin? Yes, Atorvastatin 80 mg daily   ACE-I/ARB? Yes, Lisinopril 40 mg daily   Aspirin? Yes, 81 mg daily     Pertinent PMH Review:  PMH of Pancreatitis: No  PMH of Retinopathy: No  PMH of MTC/MEN 2: No    Immunizations:  Influenza? 11/13/24  COVID? 01/17/22  Pneumonia? PCV20 06/03/24, PPSV23 05/12/17  Shingles? 06/01/20, 09/01/20  RSV: None - states Dr. Sage discussed with him about getting     Drug Interactions  No relevant drug interactions were noted.    Medication System Management  Adherence/Organization: does not typically forget his medications, does use a pill box to help with organization   Affordability/Accessibility: no issues reported today     Patient's preferred pharmacy:     [facility] - [facility] [id number] [place] [id number]  Phone: [phone] Fax: [phone]    [facility] - [place], [place] - [address]  [address] [facility] [address]  "[place] [address]  Phone: [phone] Fax: [phone]    [facility]  [address]  [place] [address]  Phone: [phone] Fax: [phone]      Objective   No Known Allergies  Social History     Social History Narrative    Not on file      Medication Reconciliation:  No changes reported by patient     Medication Review  Current Outpatient Medications   Medication Instructions    acetaminophen 500 mg capsule Take by mouth.    aspirin 81 mg, 2 times daily    atorvastatin (LIPITOR) 80 mg, oral, Daily    blood sugar diagnostic (OneTouch Verio test strips) strip Test once daily    cholecalciferol (Vitamin D-3) 50 mcg (2,000 unit) capsule Take by mouth.    lancets misc Once daily    lisinopril 20 mg, Daily    metFORMIN (GLUCOPHAGE) 1,000 mg, oral, 2 times daily (morning and late afternoon)    metoprolol tartrate (LOPRESSOR) 25 mg, oral, 2 times daily    Mounjaro 12.5 mg, subcutaneous, Weekly    OneTouch Delica Plus Lancet 30 gauge misc Daily      Vitals  BP Readings from Last 2 Encounters:   06/04/25 96/60   05/29/25 106/70     BMI Readings from Last 1 Encounters:   06/04/25 31.54 kg/m²      Labs  A1C  Lab Results   Component Value Date    HGBA1C 6.1 (H) 04/30/2025    HGBA1C 7.2 (H) 11/19/2024    HGBA1C 6.9 (H) 06/03/2024     BMP  Lab Results   Component Value Date    CALCIUM 9.7 04/30/2025     04/30/2025    K 4.4 04/30/2025    CO2 24 04/30/2025     04/30/2025    BUN 26 (H) 04/30/2025    CREATININE 1.09 04/30/2025    EGFR 75 04/30/2025     LFTs  Lab Results   Component Value Date    ALT 60 (H) 12/18/2024    AST 35 12/18/2024    ALKPHOS 61 12/18/2024    BILITOT 0.9 12/18/2024     FLP  Lab Results   Component Value Date    TRIG 314 (H) 11/19/2024    CHOL 123 11/19/2024    LDLF 38 09/21/2023    LDLCALC 25 11/19/2024    HDL 35.4 11/19/2024     Urine Microalbumin  No results found for: \"MICROALBCREA\"    Weight Management  Wt Readings from Last 3 Encounters:   06/04/25 99.7 kg " "(219 lb 12.8 oz)   05/29/25 99.3 kg (219 lb)   03/04/25 109 kg (241 lb)      Estimated body mass index is 31.54 kg/m² as calculated from the following:    Height as of 6/4/25: 1.778 m (5' 10\").    Weight as of 6/4/25: 99.7 kg (219 lb 12.8 oz).    Goal weight: <200 lbs     Patient Reported Home Weights:   04/07/25: 230 lbs   05/06/25: 223 lbs   06/11/25: 210 lbs    Assessment/Plan   Problem List Items Addressed This Visit       Type 2 diabetes mellitus without complication, without long-term current use of insulin    Patient's goal A1c is < 7%.  Is pt at goal? Yes, most recent A1c was 6.1% on 04/30/25 which had decreased from 7.2% on 11/19/24.   Patient's SMBGs have continued to remain in good range, today he reports fasting in the 90s-100s       Rationale for plan:   Patient has continued to tolerate Mounjaro well with the dose increase. Has continued to have some mild constipation but it has remained stable and has not had to use OTC laxatives. It has not worsened with dose increases.   Starting weight: 249.2 lbs 11/13/24  Current weight: 210 lbs   Has lost 39.2 lbs (~15.7% of total body weight) since starting therapy and has lost 13 lbs over the past month   Since patient has seen significant weight loss over the past 4 weeks, will continue on the 12.5 mg dose of Mounjaro     Medication Changes:  CONTINUE  Metformin 1,000 mg 1 tablet by mouth twice daily   Mounjaro 12.5 mg under the skin once weekly     Future Considerations:  If weight loss slows, could titrate Mounjaro up to 15 mg once weekly for further weight loss to target goal of <200 lbs   If experiencing good weight loss at the 12.5 mg dose could also continue him on it  Once he meets goal weight, could also reduce his dose to see if he is able to maintain weight lost and glycemic control at a lower maintenance dose   Could potentially reduce his Metformin dose if next A1c is continuing to improve     Monitoring and Education:  Will continue to monitor for " new/worsening side effects to ensure patient is tolerating Mounjaro well and that constipation is not worsening   Patient will continue checking blood sugar at home and we will go over updated readings at next encounter   Updated A1c due anytime after 10/30/25 (every 6 months if well controlled)  Patient will continue receiving Mounjaro through  patient assistance program, approved through 05/12/26    We will plan to follow-up in ~4 weeks to see how patient has done continuing with his current dose of Mounjaro. He was encouraged to reach out with any questions and/or concerns prior to then.          Relevant Medications    tirzepatide (Mounjaro) 12.5 mg/0.5 mL pen injector    Other Relevant Orders    Referral to Clinical Pharmacy     Other Visit Diagnoses         Class 2 severe obesity with serious comorbidity and body mass index (BMI) of 36.0 to 36.9 in adult, unspecified obesity type        Relevant Medications    tirzepatide (Mounjaro) 12.5 mg/0.5 mL pen injector    Other Relevant Orders    Referral to Clinical Pharmacy          Pharmacy Follow-Up: 07/09/2025   PCP Follow-Up: 12/04/2025    Continue all meds under the continuation of care with the referring provider and clinical pharmacy team.    Thank you,    Freedom Smith, PharmD   Clinical Pharmacist    Verbal consent to manage patient's drug therapy was obtained from the patient. They were informed they may decline to participate or withdraw from participation in pharmacy services at any time.

## 2025-06-10 ENCOUNTER — OFFICE VISIT (OUTPATIENT)
Dept: ORTHOPEDIC SURGERY | Facility: CLINIC | Age: 65
End: 2025-06-10
Payer: MEDICARE

## 2025-06-10 ENCOUNTER — HOSPITAL ENCOUNTER (OUTPATIENT)
Dept: RADIOLOGY | Facility: CLINIC | Age: 65
Discharge: HOME | End: 2025-06-10
Payer: MEDICARE

## 2025-06-10 DIAGNOSIS — M79.671 RIGHT FOOT PAIN: ICD-10-CM

## 2025-06-10 PROCEDURE — 99204 OFFICE O/P NEW MOD 45 MIN: CPT | Performed by: ORTHOPAEDIC SURGERY

## 2025-06-10 PROCEDURE — 99213 OFFICE O/P EST LOW 20 MIN: CPT | Performed by: ORTHOPAEDIC SURGERY

## 2025-06-10 PROCEDURE — 73630 X-RAY EXAM OF FOOT: CPT | Mod: RIGHT SIDE | Performed by: INTERNAL MEDICINE

## 2025-06-10 PROCEDURE — 1125F AMNT PAIN NOTED PAIN PRSNT: CPT | Performed by: ORTHOPAEDIC SURGERY

## 2025-06-10 PROCEDURE — 4010F ACE/ARB THERAPY RXD/TAKEN: CPT | Performed by: ORTHOPAEDIC SURGERY

## 2025-06-10 PROCEDURE — 73630 X-RAY EXAM OF FOOT: CPT | Mod: RT

## 2025-06-10 PROCEDURE — L4361 PNEUMA/VAC WALK BOOT PRE OTS: HCPCS | Performed by: ORTHOPAEDIC SURGERY

## 2025-06-10 ASSESSMENT — PAIN DESCRIPTION - DESCRIPTORS: DESCRIPTORS: ACHING

## 2025-06-10 ASSESSMENT — PAIN SCALES - GENERAL: PAINLEVEL_OUTOF10: 1

## 2025-06-10 ASSESSMENT — PAIN - FUNCTIONAL ASSESSMENT: PAIN_FUNCTIONAL_ASSESSMENT: 0-10

## 2025-06-10 NOTE — PROGRESS NOTES
HISTORY OF PRESENT ILLNESS  This is a pleasant 64 y.o. year old male  who presents on 06/10/2025 at the request of Lake Sage MD for evaluation of right foot pain that has been present over/since few days ago.    How the condition occurred or started: insidous, walked more than normal the day or so prior  Location of pain (patient points to): dorsal midfoot  Quality of pain: Moderate  Modifying Factors: worse with walking or standing  Associated Signs and symptoms: mild swelling  Previous Treatment: none, wearing sandals today  PMH: DMII last one 7.2 in 11/24, CAD, vit D def (patient not sure how  much he takes in MVI)    PHYSICAL EXAMINATION  Constitutional Exam: patient's height and weight reviewed, well-kempt  Psychiatric Exam: alert and oriented x 3, appropriate mood and behavior  Eye Exam: JACOB, EOMI  Pulmonary Exam: breathing non-labored, no apparent distress  Lymphatic exam: no appreciable lymphadenopathy in the lower extremities  Cardiovascular exam: DP pulses 2+ bilaterally, PT 2+ bilaterally, toes are pink with good capillary refill, no pitting edema  Skin exam: no open lesions, rashes, abrasions or ulcerations  Neurological exam: sensation to light touch intact dorsal feet    Musculoskeletal exam: right foot: pain over second metatarsal base and shaft with localized dorsal swelling, less pain to palpation over 3rd metatarsal, stable ligamentous exam, full ankle and ST ROM, neg N sign, no pain on bony palpation of ankle/hindfoot/midfoot, no rubor, no charcot, AAFD 2B on standing alignment    DATA/RESULTS REVIEW: I personally reviewed the patient's x-ray images and reports of the right foot show cortical hypertrophy of 2-4 MT shafts, increased calcification in second metatarsal shaft region but no discrete fracture line seen.  Insertional achilles tendinopathy posterior exostosis, mild tendinopathy peroneus brevis attachment on 5th MT base both clinically asymptomatic.  Midfoot sag and early NC  arthrosis.    ASSESSMENT: right foot stress reaction second and third metatarsals, hx of DMII and vit D deficiency, adult onset flatfoot deformity related to posterior tibial tendon dysfunction and early NC arthrosis  PLAN: I discussed with the patient the differential diagnosis, complex overuse, diabetes, bone metabolism dysfunction related nature of the condition(s) and available treatment option(s).  Due to localized pain and swelling on metatarsals, likely stress reaction and placed patient in cam boot WBAT for 4 weeks and then wean into sturdy stiffer soled but well cushioned inside athletic shoe, no thin sandals.  Discussed diabetic footcare precautions in detail, avoid walking barefooted like he is in the house due to risk.  The patient was given a prescription for custom-made orthotics, which are medically necessary due to the patient's medical condition and symptomatic posterior tibial tendon dysfunction and required for medial-lateral stability.  The patient is ambulatory.  Duration will be greater than 6 months.  Instructed patient on achilles stretching program protocol exercises that should be done daily to help with flexibility and flatfoot.  FUV in 6 weeks or so, repeat xrays AP/lat/obl right foot to assess healing or any bony reaction.   Discussed with patient to aim with 2,000 units of vit D per day.  The patient's questions were answered in detail.      Note dictated with PushPoint software, completed without full type editing to avoid delay.      Dear Referring Physician,  It was my pleasure to see your patient, in the office today.  Please refer to the detailed notes above for my findings and recommendations.  Thank you once again for your consultation request.  If you have any further questions or concerns, please feel free to contact me via email or at the phone number and address below.  Sincerely,    Adelita Astudillo MD   of Orthopedic Surgery, Case  "[employer]  Dual Fellowship-trained in Orthopedic Sports Medicine (Knee and Shoulder), and Foot and Ankle Surgery  The  [facility] [place]   [employer] Subspecialty Certificate in Orthopedic Sports Medicine  Head Team Physician Case \"[place]\" and [place] \"[place]\"  Team [employer] OP Physician [phone] [employer]  Team [employer] [place] Figure Skating Medical Practitioner, including International Event Coverage  Emeritus Director, Foot and Ankle Division, Department of Orthopedics    [address], [place] [address]  [email or web address]  Office [phone]  "

## 2025-06-11 ENCOUNTER — PHARMACY VISIT (OUTPATIENT)
Dept: PHARMACY | Facility: CLINIC | Age: 65
End: 2025-06-11
Payer: COMMERCIAL

## 2025-06-11 ENCOUNTER — APPOINTMENT (OUTPATIENT)
Dept: PHARMACY | Facility: HOSPITAL | Age: 65
End: 2025-06-11
Payer: COMMERCIAL

## 2025-06-11 DIAGNOSIS — E66.812 CLASS 2 SEVERE OBESITY WITH SERIOUS COMORBIDITY AND BODY MASS INDEX (BMI) OF 36.0 TO 36.9 IN ADULT, UNSPECIFIED OBESITY TYPE: ICD-10-CM

## 2025-06-11 DIAGNOSIS — E66.01 CLASS 2 SEVERE OBESITY WITH SERIOUS COMORBIDITY AND BODY MASS INDEX (BMI) OF 36.0 TO 36.9 IN ADULT, UNSPECIFIED OBESITY TYPE: ICD-10-CM

## 2025-06-11 DIAGNOSIS — E11.9 TYPE 2 DIABETES MELLITUS WITHOUT COMPLICATION, WITHOUT LONG-TERM CURRENT USE OF INSULIN: ICD-10-CM

## 2025-06-11 PROCEDURE — RXMED WILLOW AMBULATORY MEDICATION CHARGE

## 2025-06-11 RX ORDER — TIRZEPATIDE 12.5 MG/.5ML
12.5 INJECTION, SOLUTION SUBCUTANEOUS WEEKLY
Qty: 2 ML | Refills: 0 | Status: SHIPPED | OUTPATIENT
Start: 2025-06-11

## 2025-06-11 NOTE — ASSESSMENT & PLAN NOTE
Patient's goal A1c is < 7%.  Is pt at goal? Yes, most recent A1c was 6.1% on 04/30/25 which had decreased from 7.2% on 11/19/24.   Patient's SMBGs have continued to remain in good range, today he reports fasting in the 90s-100s       Rationale for plan:   Patient has continued to tolerate Mounjaro well with the dose increase. Has continued to have some mild constipation but it has remained stable and has not had to use OTC laxatives. It has not worsened with dose increases.   Starting weight: 249.2 lbs 11/13/24  Current weight: 210 lbs   Has lost 39.2 lbs (~15.7% of total body weight) since starting therapy and has lost 13 lbs over the past month   Since patient has seen significant weight loss over the past 4 weeks, will continue on the 12.5 mg dose of Mounjaro     Medication Changes:  CONTINUE  Metformin 1,000 mg 1 tablet by mouth twice daily   Mounjaro 12.5 mg under the skin once weekly     Future Considerations:  If weight loss slows, could titrate Mounjaro up to 15 mg once weekly for further weight loss to target goal of <200 lbs   If experiencing good weight loss at the 12.5 mg dose could also continue him on it  Once he meets goal weight, could also reduce his dose to see if he is able to maintain weight lost and glycemic control at a lower maintenance dose   Could potentially reduce his Metformin dose if next A1c is continuing to improve     Monitoring and Education:  Will continue to monitor for new/worsening side effects to ensure patient is tolerating Mounjaro well and that constipation is not worsening   Patient will continue checking blood sugar at home and we will go over updated readings at next encounter   Updated A1c due anytime after 10/30/25 (every 6 months if well controlled)  Patient will continue receiving Mounjaro through  patient assistance program, approved through 05/12/26    We will plan to follow-up in ~4 weeks to see how patient has done continuing with his current dose of Mounjaro. He  was encouraged to reach out with any questions and/or concerns prior to then.

## 2025-06-18 DIAGNOSIS — I25.10 ATHEROSCLEROTIC HEART DISEASE OF NATIVE CORONARY ARTERY WITHOUT ANGINA PECTORIS: ICD-10-CM

## 2025-06-18 RX ORDER — METOPROLOL TARTRATE 25 MG/1
25 TABLET, FILM COATED ORAL EVERY 12 HOURS
Qty: 180 TABLET | Refills: 3 | Status: SHIPPED | OUTPATIENT
Start: 2025-06-18

## 2025-07-09 ENCOUNTER — PHARMACY VISIT (OUTPATIENT)
Dept: PHARMACY | Facility: CLINIC | Age: 65
End: 2025-07-09
Payer: COMMERCIAL

## 2025-07-09 ENCOUNTER — APPOINTMENT (OUTPATIENT)
Dept: PHARMACY | Facility: HOSPITAL | Age: 65
End: 2025-07-09
Payer: COMMERCIAL

## 2025-07-09 DIAGNOSIS — E66.812 CLASS 2 SEVERE OBESITY WITH SERIOUS COMORBIDITY AND BODY MASS INDEX (BMI) OF 36.0 TO 36.9 IN ADULT, UNSPECIFIED OBESITY TYPE: ICD-10-CM

## 2025-07-09 DIAGNOSIS — E66.01 CLASS 2 SEVERE OBESITY WITH SERIOUS COMORBIDITY AND BODY MASS INDEX (BMI) OF 36.0 TO 36.9 IN ADULT, UNSPECIFIED OBESITY TYPE: ICD-10-CM

## 2025-07-09 DIAGNOSIS — E11.9 TYPE 2 DIABETES MELLITUS WITHOUT COMPLICATION, WITHOUT LONG-TERM CURRENT USE OF INSULIN: Primary | ICD-10-CM

## 2025-07-09 PROCEDURE — RXMED WILLOW AMBULATORY MEDICATION CHARGE

## 2025-07-09 RX ORDER — TIRZEPATIDE 12.5 MG/.5ML
12.5 INJECTION, SOLUTION SUBCUTANEOUS WEEKLY
Qty: 2 ML | Refills: 0 | Status: SHIPPED | OUTPATIENT
Start: 2025-07-09

## 2025-07-09 NOTE — ASSESSMENT & PLAN NOTE
Patient's goal A1c is < 7%.  Is pt at goal? Yes, most recent A1c was 6.1% on 04/30/25 which had decreased from 7.2% on 11/19/24.   Patient's SMBGs have continued to remain in good range, today he reports fasting typically in the lower 90s        Rationale for plan:   Patient has continued to tolerate Mounjaro well. Has continued to have some mild constipation and nausea but it has remained stable,  Starting weight: 249.2 lbs 11/13/24  Current weight: 205 lbs   Has lost 44.2 lbs (~17.7% of total body weight)   Since patient has continued to see weight loss with the 12.5 mg dose and is close to weight goal, will continue on the current dose of Mounjaro    Medication Changes:  CONTINUE  Metformin 1,000 mg 1 tablet by mouth twice daily   Mounjaro 12.5 mg under the skin once weekly     Future Considerations:  If weight loss slows, could titrate Mounjaro up to 15 mg once weekly for further weight loss to target goal of <200 lbs   If experiencing good weight loss at the 12.5 mg dose could also continue him on it  Once he meets goal weight, could also reduce his dose to see if he is able to maintain weight lost and glycemic control at a lower maintenance dose   Could potentially reduce his Metformin dose if next A1c is continuing to improve     Monitoring and Education:  Will continue to monitor for new/worsening side effects to ensure patient is tolerating Mounjaro well   Will obtain updated home weight at next encounter to see if patient has met goal weight yet   Patient will continue checking blood sugar at home and we will go over updated readings at next encounter   Updated A1c due anytime after 10/30/25 (every 6 months if well controlled)  Patient will continue receiving Mounjaro through  patient assistance program, approved through 05/12/26    We will plan to follow-up in 4 weeks to see how patient has done continuing with his current dose of Mounjaro. He was encouraged to reach out with any questions and/or  concerns prior to then.

## 2025-07-09 NOTE — PROGRESS NOTES
Clinical Pharmacy Appointment    Patient ID: Sal Kruger is a 64 y.o. male who presents for Diabetes.    Pt is here for Follow Up appointment.     Referring Provider: Lake Sage MD  PCP: Lake Sage MD   Last visit with PCP: 05/29/2025  Next visit with PCP: 12/04/2025    Subjective     Patient was referred to the pharmacy team for assistance with medication management of his diabetes and obesity. On 11/19/24 patient's A1c increased up to 7.2%.     At last pharmacy encounter, patient was doing well with the 12.5 mg dose of Mounjaro and noted continued mild constipation that had remained stable. Since he had significant weight loss on the 12.5 mg dose he was kept there for another month. We are following-up today to see how he has continued to do with the medication.     HPI  DIABETES MELLITUS TYPE 2:    Diagnosed with diabetes: ~4-5 years per patient. Known diabetic complications: CAD, obesity.  Does patient follow with Endocrinology: No  Last optometry exam: November 2024  Most recent visit in Podiatry: does not follow with podiatry -- patient denies sores or cuts on feet today      Current diabetic medications include:  Metformin 1,000 mg twice daily   Mounjaro 12.5 mg once weekly (Thursdays)    Historical diabetic medications include:   Glimepiride 2 mg (stopped when patient was started on Farxiga)  Farxiga 5 mg (blood sugars well controlled with just Mounjaro and Metformin)    Adverse Effects:     Still notes some mild constipation but has been manageable and has not worsened, has not had to use any OTC laxatives yet to help   Does note some mild nausea that is usually very short lived     Glucose Readings:  Glucometer/CGM Type: OneTouch Verio Glucometer   Patient tests BG a couple times per week typically fasting in the morning     Current home BG readings:   Fasting readings have consistently been <100 (typically the low 90s)     Previous home BG readings:   Fasting typically in the  90s-100s    Any episodes of hypoglycemia? No, denies any readings <70 mg/dL or signs/symptoms of hypoglycemia   Does pt have proteinuria? Unknown, no completed lab for UACR in chart     Lifestyle:  No changes reported today   Diet: 3 meals/day.   States smaller portion sizes with Mounjaro and generally trying to eat healthier    Breakfast: eggs, turkey sausage, sometimes toast or an english muffin and black coffee   Lunch: leftover from the night before, chicken salad, tuna salad   Dinner: all meals are cooked at home, tries to get a vegetable in with meals   Snacks: does snack in the afternoon - piece of fruit, will have pretzels at night   Drinks: water, Gatorade Zero   Physical Activity:   Uses the exercise bike and is trying to do ~20 minute sessions every other day     Secondary Prevention:  Statin? Yes, Atorvastatin 80 mg daily   ACE-I/ARB? Yes, Lisinopril 20 mg daily   Aspirin? Yes, 81 mg daily     Pertinent PMH Review:  PMH of Pancreatitis: No  PMH of Retinopathy: No  PMH of MTC/MEN 2: No    Immunizations:  Influenza? 11/13/24  COVID? 01/17/22  Pneumonia? PCV20 06/03/24, PPSV23 05/12/17  Shingles? 06/01/20, 09/01/20  RSV: None - states Dr. Sage discussed with him about getting     Drug Interactions  No relevant drug interactions were noted.    Medication System Management  Adherence/Organization: does not typically forget his medications, does use a pill box to help with organization   Affordability/Accessibility: no issues reported today     Patient's preferred pharmacy:     [facility] - [place] [id number] [address]  Phone: [phone] Fax: [phone]    [facility] - [place], [place] - [address]  [address] [facility] [address]  Phone: [phone] Fax: [phone]    [facility]  [address], [address]  [facility] [address]  Phone: [phone] Fax: [phone]      Objective   No Known  "Allergies  Social History     Social History Narrative    Not on file      Medication Reconciliation:  No changes reported by patient     Medication Review  Current Outpatient Medications   Medication Instructions    acetaminophen 500 mg capsule Take by mouth.    aspirin 81 mg, 2 times daily    atorvastatin (LIPITOR) 80 mg, oral, Daily    blood sugar diagnostic (OneTouch Verio test strips) strip Test once daily    cholecalciferol (Vitamin D-3) 50 mcg (2,000 unit) capsule Take by mouth.    lancets misc Once daily    lisinopril 20 mg, Daily    metFORMIN (GLUCOPHAGE) 1,000 mg, oral, 2 times daily (morning and late afternoon)    metoprolol tartrate (LOPRESSOR) 25 mg, oral, Every 12 hours    Mounjaro 12.5 mg, subcutaneous, Weekly    OneTouch Delica Plus Lancet 30 gauge misc Daily      Vitals  BP Readings from Last 2 Encounters:   06/04/25 96/60   05/29/25 106/70     BMI Readings from Last 1 Encounters:   06/04/25 31.54 kg/m²      Labs  A1C  Lab Results   Component Value Date    HGBA1C 6.1 (H) 04/30/2025    HGBA1C 7.2 (H) 11/19/2024    HGBA1C 6.9 (H) 06/03/2024     BMP  Lab Results   Component Value Date    CALCIUM 9.7 04/30/2025     04/30/2025    K 4.4 04/30/2025    CO2 24 04/30/2025     04/30/2025    BUN 26 (H) 04/30/2025    CREATININE 1.09 04/30/2025    EGFR 75 04/30/2025     LFTs  Lab Results   Component Value Date    ALT 60 (H) 12/18/2024    AST 35 12/18/2024    ALKPHOS 61 12/18/2024    BILITOT 0.9 12/18/2024     FLP  Lab Results   Component Value Date    TRIG 314 (H) 11/19/2024    CHOL 123 11/19/2024    LDLF 38 09/21/2023    LDLCALC 25 11/19/2024    HDL 35.4 11/19/2024     Urine Microalbumin  No results found for: \"MICROALBCREA\"    Weight Management  Wt Readings from Last 3 Encounters:   06/04/25 99.7 kg (219 lb 12.8 oz)   05/29/25 99.3 kg (219 lb)   03/04/25 109 kg (241 lb)      Estimated body mass index is 31.54 kg/m² as calculated from the following:    Height as of 6/4/25: 1.778 m (5' 10\").    Weight " as of 6/4/25: 99.7 kg (219 lb 12.8 oz).    Goal weight: <200 lbs     Patient Reported Home Weights:   04/07/25: 230 lbs   05/06/25: 223 lbs   06/11/25: 210 lbs  07/09/25: 205 lbs     Assessment/Plan   Problem List Items Addressed This Visit       Type 2 diabetes mellitus without complication, without long-term current use of insulin - Primary    Patient's goal A1c is < 7%.  Is pt at goal? Yes, most recent A1c was 6.1% on 04/30/25 which had decreased from 7.2% on 11/19/24.   Patient's SMBGs have continued to remain in good range, today he reports fasting typically in the lower 90s        Rationale for plan:   Patient has continued to tolerate Mounjaro well. Has continued to have some mild constipation and nausea but it has remained stable,  Starting weight: 249.2 lbs 11/13/24  Current weight: 205 lbs   Has lost 44.2 lbs (~17.7% of total body weight)   Since patient has continued to see weight loss with the 12.5 mg dose and is close to weight goal, will continue on the current dose of Mounjaro    Medication Changes:  CONTINUE  Metformin 1,000 mg 1 tablet by mouth twice daily   Mounjaro 12.5 mg under the skin once weekly     Future Considerations:  If weight loss slows, could titrate Mounjaro up to 15 mg once weekly for further weight loss to target goal of <200 lbs   If experiencing good weight loss at the 12.5 mg dose could also continue him on it  Once he meets goal weight, could also reduce his dose to see if he is able to maintain weight lost and glycemic control at a lower maintenance dose   Could potentially reduce his Metformin dose if next A1c is continuing to improve     Monitoring and Education:  Will continue to monitor for new/worsening side effects to ensure patient is tolerating Mounjaro well   Will obtain updated home weight at next encounter to see if patient has met goal weight yet   Patient will continue checking blood sugar at home and we will go over updated readings at next encounter   Updated A1c  due anytime after 10/30/25 (every 6 months if well controlled)  Patient will continue receiving Mounjaro through  patient assistance program, approved through 05/12/26    We will plan to follow-up in 4 weeks to see how patient has done continuing with his current dose of Mounjaro. He was encouraged to reach out with any questions and/or concerns prior to then.          Relevant Medications    tirzepatide (Mounjaro) 12.5 mg/0.5 mL pen injector    Other Relevant Orders    Referral to Clinical Pharmacy     Other Visit Diagnoses         Class 2 severe obesity with serious comorbidity and body mass index (BMI) of 36.0 to 36.9 in adult, unspecified obesity type        Relevant Medications    tirzepatide (Mounjaro) 12.5 mg/0.5 mL pen injector    Other Relevant Orders    Referral to Clinical Pharmacy          Pharmacy Follow-Up: 08/06/2025   PCP Follow-Up: 12/04/2025    Continue all meds under the continuation of care with the referring provider and clinical pharmacy team.    Thank you,    Freedom Smith, PharmD   Clinical Pharmacist    Verbal consent to manage patient's drug therapy was obtained from the patient. They were informed they may decline to participate or withdraw from participation in pharmacy services at any time.

## 2025-07-10 ENCOUNTER — PATIENT MESSAGE (OUTPATIENT)
Dept: PRIMARY CARE | Facility: CLINIC | Age: 65
End: 2025-07-10
Payer: COMMERCIAL

## 2025-07-10 DIAGNOSIS — I10 ESSENTIAL HYPERTENSION: ICD-10-CM

## 2025-07-10 RX ORDER — LISINOPRIL 10 MG/1
10 TABLET ORAL DAILY
Qty: 90 TABLET | Refills: 3 | Status: SHIPPED | OUTPATIENT
Start: 2025-07-10

## 2025-07-29 ENCOUNTER — HOSPITAL ENCOUNTER (OUTPATIENT)
Dept: RADIOLOGY | Facility: CLINIC | Age: 65
Discharge: HOME | End: 2025-07-29
Payer: MEDICARE

## 2025-07-29 ENCOUNTER — OFFICE VISIT (OUTPATIENT)
Dept: ORTHOPEDIC SURGERY | Facility: CLINIC | Age: 65
End: 2025-07-29
Payer: MEDICARE

## 2025-07-29 DIAGNOSIS — M79.671 RIGHT FOOT PAIN: ICD-10-CM

## 2025-07-29 DIAGNOSIS — M79.671 RIGHT FOOT PAIN: Primary | ICD-10-CM

## 2025-07-29 DIAGNOSIS — M67.00: ICD-10-CM

## 2025-07-29 DIAGNOSIS — E11.9 TYPE 2 DIABETES MELLITUS WITHOUT COMPLICATION, WITHOUT LONG-TERM CURRENT USE OF INSULIN: ICD-10-CM

## 2025-07-29 DIAGNOSIS — M21.41 BILATERAL PES PLANUS: ICD-10-CM

## 2025-07-29 DIAGNOSIS — M21.42 BILATERAL PES PLANUS: ICD-10-CM

## 2025-07-29 DIAGNOSIS — M76.821 POSTERIOR TIBIAL TENDON DYSFUNCTION, RIGHT: ICD-10-CM

## 2025-07-29 PROCEDURE — 73630 X-RAY EXAM OF FOOT: CPT | Mod: RIGHT SIDE | Performed by: RADIOLOGY

## 2025-07-29 PROCEDURE — 99214 OFFICE O/P EST MOD 30 MIN: CPT | Performed by: ORTHOPAEDIC SURGERY

## 2025-07-29 PROCEDURE — 73630 X-RAY EXAM OF FOOT: CPT | Mod: RT

## 2025-07-29 PROCEDURE — 99212 OFFICE O/P EST SF 10 MIN: CPT | Performed by: ORTHOPAEDIC SURGERY

## 2025-07-29 PROCEDURE — 4010F ACE/ARB THERAPY RXD/TAKEN: CPT | Performed by: ORTHOPAEDIC SURGERY

## 2025-07-29 ASSESSMENT — PAIN - FUNCTIONAL ASSESSMENT: PAIN_FUNCTIONAL_ASSESSMENT: NO/DENIES PAIN

## 2025-08-04 NOTE — PROGRESS NOTES
HISTORY OF PRESENT ILLNESS  This is a pleasant 64 y.o. year old male  who presents on 07/29/2025 at the request of Lake Sage MD for evaluation of right foot for diabetic care that has been present over/since few months.    How the condition occurred or started: insidious  Location of pain (patient points to): arch intermittently  Quality of pain: Mild  Modifying Factors: worse with a lot of walking  Associated Signs and symptoms: no significant N/T in feet  Previous Treatment: none    PHYSICAL EXAMINATION  Constitutional Exam: patient's height and weight reviewed, well-kempt  Psychiatric Exam: alert and oriented x 3, appropriate mood and behavior  Eye Exam: JACOB, EOMI  Pulmonary Exam: breathing non-labored, no apparent distress  Lymphatic exam: no appreciable lymphadenopathy in the lower extremities  Cardiovascular exam: DP pulses 2+ bilaterally, PT 2+ bilaterally, toes are pink with good capillary refill, no pitting edema  Skin exam: no open lesions, rashes, abrasions or ulcerations  Neurological exam: neuropathy in feet, sensation to light touch intact in both lower extremities in peripheral and dermatomal distributions (except for any abnormalities noted in musculoskeletal exam)    Musculoskeletal exam: right foot and ankle: AAFD mild grade 2 with posterior tibial tendon dysfunction, no pain over 5th MT base, tight achilles, no pain on palpation of bony structures of ankle/hindfoot/forefoot    DATA/RESULTS REVIEW: I personally reviewed the patient's x-ray images and reports of the right foot show midfoot sag, no acute findings or fractures.     ASSESSMENT: minimal adult flatfoot/posterior tibial tendon dysfunction, hx of diabetes type 2, presents for preventative foot care and requests diabetic shoes and inserts  PLAN: I discussed with the patient the differential diagnosis, complex diabetes related nature of the condition(s) and available treatment option(s).  Reviewed with him diabetic foot care precautions  "to keep his feet healthy with diabetes- no walking barefooted, use white cotton type socks, use diabetic type shoes with soft leather upper and wide/rounded toe box, check his feet daily for any hot pressure spots or blisters or ulcers.  The patient was given a prescription for custom made orthotic inserts and diabetic shoes, which are medically necessary due to the patient's diabetes, neuropathy and pre-ulcerative calluses.  The patient is ambulatory.  For nail and skin care for long term, recommend that he schedule with a podiatrist.  FUV prn.  The patient's questions were answered in detail.      Note dictated with Circular software, completed without full type editing to avoid delay.      Dear Referring Physician,  It was my pleasure to see your patient, in the office today.  Please refer to the detailed notes above for my findings and recommendations.  Thank you once again for your consultation request.  If you have any further questions or concerns, please feel free to contact me via email or at the phone number and address below.  Sincerely,    Adelita Astudillo MD   of Orthopedic Surgery, [employer]  Dual Fellowship-trained in Orthopedic Sports Medicine (Knee and Shoulder), and Foot and Ankle Surgery  The  [facility] [place]   [employer] Subspecialty Certificate in Orthopedic Sports Medicine  Head Team Physician Case \"[place]\" and [place] \"[place]\"  Team [employer] OP Physician [id number] Par[employer]ympic Games  Team [place] Figure Skating Medical Practitioner, including International Event Coverage  Emeritus Director, Foot and Ankle Division, Department of Orthopedics    [address], [place] [address]  Office [phone]  "

## 2025-08-06 ENCOUNTER — APPOINTMENT (OUTPATIENT)
Dept: PHARMACY | Facility: HOSPITAL | Age: 65
End: 2025-08-06
Payer: COMMERCIAL

## 2025-08-06 DIAGNOSIS — E66.01 CLASS 2 SEVERE OBESITY WITH SERIOUS COMORBIDITY AND BODY MASS INDEX (BMI) OF 36.0 TO 36.9 IN ADULT, UNSPECIFIED OBESITY TYPE: ICD-10-CM

## 2025-08-06 DIAGNOSIS — I10 ESSENTIAL HYPERTENSION: ICD-10-CM

## 2025-08-06 DIAGNOSIS — E66.812 CLASS 2 SEVERE OBESITY WITH SERIOUS COMORBIDITY AND BODY MASS INDEX (BMI) OF 36.0 TO 36.9 IN ADULT, UNSPECIFIED OBESITY TYPE: ICD-10-CM

## 2025-08-06 DIAGNOSIS — E11.9 TYPE 2 DIABETES MELLITUS WITHOUT COMPLICATION, WITHOUT LONG-TERM CURRENT USE OF INSULIN: Primary | ICD-10-CM

## 2025-08-06 PROCEDURE — RXMED WILLOW AMBULATORY MEDICATION CHARGE

## 2025-08-06 RX ORDER — TIRZEPATIDE 15 MG/.5ML
15 INJECTION, SOLUTION SUBCUTANEOUS WEEKLY
Qty: 2 ML | Refills: 0 | Status: SHIPPED | OUTPATIENT
Start: 2025-08-06

## 2025-08-06 NOTE — ASSESSMENT & PLAN NOTE
Patient's goal A1c is < 7%.  Is pt at goal? Yes, most recent A1c was 6.1% on 04/30/25 which had decreased from 7.2% on 11/19/24.   Patient's SMBGs have continued to remain in good range, today he reports fasting blood sugars typically in the 80s-90s.       Rationale for plan:   Patient has continued to tolerate Mounjaro well. Constipation has resolved, still has some nausea but is unsure if this is due to dental work or his Mounjaro, it has been manageable.   Starting weight: 249.2 lbs 11/13/24  Current weight: 205 lbs   Has lost 44.2 lbs (~17.7% of total body weight) since starting Mounjaro  Weight has remained stable over the past month and he has lost 0 lbs  Due to weight loss hitting a plateau with the 12.5 mg dose, will titrate up to the 15 mg dose to help him reach goal weight of <200 lbs.     Medication Changes:  CONTINUE  Metformin 1,000 mg 1 tablet by mouth twice daily   INCREASE  Mounjaro 15 mg under the skin once weekly (increased from 12.5 mg)  Will give last 2 doses of 12.5 mg tomorrow 08/07 and next Thursday 08/14  Will increase up to 15 mg effective with dose on 08/21    Future Considerations:  Will likely keep at 15 mg once weekly of Mounjaro until he reaches goal weight   Once he meets goal weight, could reduce his dose to see if he is able to maintain weight lost and glycemic control at a lower maintenance dose   Could potentially reduce his Metformin dose if next A1c is continuing to improve     Monitoring and Education:  Will continue to monitor for new/worsening side effects to ensure patient is tolerating Mounjaro well with the dose increase  Will obtain updated home weight at next encounter to see if patient has met goal weight yet   Patient will continue checking blood sugar at home and we will go over updated readings at next encounter   Updated A1c due anytime after 10/30/25 (every 6 months if well controlled)  Patient will continue receiving Mounjaro through  patient assistance program,  approved through 05/12/26  Reached out to Dr. Sage during encounter today regarding patients's Lisinopril dose due to reported BP readings in the 80s-90s/60s-70s. Patient will hold Lisinopril completely for now and was instructed to reach out if BP readings increase to be consistently >130/85 mmHg. He will continue checking BP at home and we will go over updated readings at next pharmacy encounter as well.     We will plan to follow-up in ~1 month to see how patient has done with the increased dose of Mounjaro. He was encouraged to reach out with any questions and/or concerns prior to then.

## 2025-08-06 NOTE — PROGRESS NOTES
Clinical Pharmacy Appointment    Patient ID: Sal Kruger is a 64 y.o. male who presents for Diabetes.    Pt is here for Follow Up appointment.     Referring Provider: Lake Sage MD  PCP: Lake Sage MD   Last visit with PCP: 05/29/2025  Next visit with PCP: 12/04/2025    Subjective     Patient was referred to the pharmacy team for assistance with medication management of his diabetes and obesity. On 11/19/24 patient's A1c increased up to 7.2%.     At last pharmacy encounter, patient was doing well with the 12.5 mg dose of Mounjaro and was continuing to see weight loss with it. Since he was close to his weight goal, he was continued on the same dose. We are following-up today to see how he has done over the past month.     He reached out to Dr. Sage on 07/10/25 regarding low BP readings in the 80s/50s-60s and symptoms of lightheadedness. He was instructed to cut the lisinopril in half to 10 mg once daily and to reach out if the lightheadedness did not improve. Today notes BP readings have continued to be consistently 80s-90s/60s-70s. Does note some lightheadedness that has improved but has not completely resolved.     HPI  DIABETES MELLITUS TYPE 2:    Diagnosed with diabetes: ~4-5 years per patient. Known diabetic complications: CAD, obesity.  Does patient follow with Endocrinology: No  Last optometry exam: November 2024  Most recent visit in Podiatry: does not follow with podiatry -- patient denies sores or cuts on feet today      Current diabetic medications include:  Metformin 1,000 mg twice daily   Mounjaro 12.5 mg once weekly (Thursdays)  Has 2 pens left at home     Historical diabetic medications include:   Glimepiride 2 mg (stopped when patient was started on Farxiga)  Farxiga 5 mg (blood sugars well controlled with just Mounjaro and Metformin)    Adverse Effects:     Notes constipation has mostly resolved    Has had some dental worth and notes some nausea with certain foods, unsure if it's due  to to the Mounjaro or dental work     Glucose Readings:  Glucometer/CGM Type: OneTouch Verio Glucometer   Patient tests BG a couple times per week typically fasting in the morning     Current home BG readings:   Fasting typically in the 80s-90s     Previous home BG readings:   Fasting typically <100 (low 90s)    Any episodes of hypoglycemia? No, denies any readings <70 mg/dL or signs/symptoms of hypoglycemia   Does pt have proteinuria? Unknown, no completed lab for UACR in chart     Lifestyle:  No changes reported today   Diet: 3 meals/day.   States smaller portion sizes with Mounjaro and generally trying to eat healthier    Breakfast: eggs, turkey sausage, sometimes toast or an english muffin and black coffee   Lunch: leftover from the night before, chicken salad, tuna salad   Dinner: all meals are cooked at home, tries to get a vegetable in with meals   Snacks: does snack in the afternoon - piece of fruit, will have pretzels at night   Drinks: water, Gatorade Zero   Physical Activity:   Uses the exercise bike and is trying to do ~20 minute sessions every other day     Secondary Prevention:  Statin? Yes, Atorvastatin 80 mg daily   ACE-I/ARB? Yes, Lisinopril 20 mg daily   Aspirin? Yes, 81 mg daily     Pertinent PMH Review:  PMH of Pancreatitis: No  PMH of Retinopathy: No  PMH of MTC/MEN 2: No    Immunizations:  Influenza? 11/13/24  COVID? 01/17/22  Pneumonia? PCV20 06/03/24, PPSV23 05/12/17  Shingles? 06/01/20, 09/01/20  RSV: None - states Dr. Sage discussed with him about getting     Drug Interactions  No relevant drug interactions were noted.    Medication System Management  Adherence/Organization: does not typically forget his medications, does use a pill box to help with organization   Affordability/Accessibility: no issues reported today     Patient's preferred pharmacy:     [address] - [facility] [address] [facility]  [address] [facility] [address]  Phone: [phone]  Fax: [phone]    [facility] - [place], [place] - [address]  [address]  [place] [address]  Phone: [phone] Fax: [phone]    [facility]  [address], [address]  [facility] [address]  Phone: [phone] Fax: [phone]      Objective   No Known Allergies  Social History     Social History Narrative    Not on file      Medication Reconciliation:  No changes reported by patient     Medication Review  Current Outpatient Medications   Medication Instructions    acetaminophen 500 mg capsule Take by mouth.    aspirin 81 mg, 2 times daily    atorvastatin (LIPITOR) 80 mg, oral, Daily    blood sugar diagnostic (OneTouch Verio test strips) strip Test once daily    cholecalciferol (Vitamin D-3) 50 mcg (2,000 unit) capsule Take by mouth.    lancets misc Once daily    metFORMIN (GLUCOPHAGE) 1,000 mg, oral, 2 times daily (morning and late afternoon)    metoprolol tartrate (LOPRESSOR) 25 mg, oral, Every 12 hours    Mounjaro 15 mg, subcutaneous, Weekly    OneTouch Delica Plus Lancet 30 gauge misc Daily      Vitals  BP Readings from Last 2 Encounters:   06/04/25 96/60   05/29/25 106/70     BMI Readings from Last 1 Encounters:   06/04/25 31.54 kg/m²      Labs  A1C  Lab Results   Component Value Date    HGBA1C 6.1 (H) 04/30/2025    HGBA1C 7.2 (H) 11/19/2024    HGBA1C 6.9 (H) 06/03/2024     BMP  Lab Results   Component Value Date    CALCIUM 9.7 04/30/2025     04/30/2025    K 4.4 04/30/2025    CO2 24 04/30/2025     04/30/2025    BUN 26 (H) 04/30/2025    CREATININE 1.09 04/30/2025    EGFR 75 04/30/2025     LFTs  Lab Results   Component Value Date    ALT 60 (H) 12/18/2024    AST 35 12/18/2024    ALKPHOS 61 12/18/2024    BILITOT 0.9 12/18/2024     FLP  Lab Results   Component Value Date    TRIG 314 (H) 11/19/2024    CHOL 123 11/19/2024    LDLF 38 09/21/2023    LDLCALC 25 11/19/2024    HDL 35.4 11/19/2024     Urine Microalbumin  No results found for:  "\"MICROALBCREA\"    Weight Management  Wt Readings from Last 3 Encounters:   06/04/25 99.7 kg (219 lb 12.8 oz)   05/29/25 99.3 kg (219 lb)   03/04/25 109 kg (241 lb)      Estimated body mass index is 31.54 kg/m² as calculated from the following:    Height as of 6/4/25: 1.778 m (5' 10\").    Weight as of 6/4/25: 99.7 kg (219 lb 12.8 oz).    Goal weight: <200 lbs     Patient Reported Home Weights:   04/07/25: 230 lbs   05/06/25: 223 lbs   06/11/25: 210 lbs  07/09/25: 205 lbs   08/06/25: 205 lbs     Assessment/Plan   Problem List Items Addressed This Visit       Type 2 diabetes mellitus without complication, without long-term current use of insulin - Primary    Patient's goal A1c is < 7%.  Is pt at goal? Yes, most recent A1c was 6.1% on 04/30/25 which had decreased from 7.2% on 11/19/24.   Patient's SMBGs have continued to remain in good range, today he reports fasting blood sugars typically in the 80s-90s.       Rationale for plan:   Patient has continued to tolerate Mounjaro well. Constipation has resolved, still has some nausea but is unsure if this is due to dental work or his Mounjaro, it has been manageable.   Starting weight: 249.2 lbs 11/13/24  Current weight: 205 lbs   Has lost 44.2 lbs (~17.7% of total body weight) since starting Mounjaro  Weight has remained stable over the past month and he has lost 0 lbs  Due to weight loss hitting a plateau with the 12.5 mg dose, will titrate up to the 15 mg dose to help him reach goal weight of <200 lbs.     Medication Changes:  CONTINUE  Metformin 1,000 mg 1 tablet by mouth twice daily   INCREASE  Mounjaro 15 mg under the skin once weekly (increased from 12.5 mg)  Will give last 2 doses of 12.5 mg tomorrow 08/07 and next Thursday 08/14  Will increase up to 15 mg effective with dose on 08/21    Future Considerations:  Will likely keep at 15 mg once weekly of Mounjaro until he reaches goal weight   Once he meets goal weight, could reduce his dose to see if he is able to " maintain weight lost and glycemic control at a lower maintenance dose   Could potentially reduce his Metformin dose if next A1c is continuing to improve     Monitoring and Education:  Will continue to monitor for new/worsening side effects to ensure patient is tolerating Mounjaro well with the dose increase  Will obtain updated home weight at next encounter to see if patient has met goal weight yet   Patient will continue checking blood sugar at home and we will go over updated readings at next encounter   Updated A1c due anytime after 10/30/25 (every 6 months if well controlled)  Patient will continue receiving Mounjaro through  patient assistance program, approved through 05/12/26  Reached out to Dr. Sage during encounter today regarding patients's Lisinopril dose due to reported BP readings in the 80s-90s/60s-70s. Patient will hold Lisinopril completely for now and was instructed to reach out if BP readings increase to be consistently >130/85 mmHg. He will continue checking BP at home and we will go over updated readings at next pharmacy encounter as well.     We will plan to follow-up in ~1 month to see how patient has done with the increased dose of Mounjaro. He was encouraged to reach out with any questions and/or concerns prior to then.          Relevant Medications    tirzepatide (Mounjaro) 15 mg/0.5 mL pen injector    Other Relevant Orders    Referral to Clinical Pharmacy     Other Visit Diagnoses         Class 2 severe obesity with serious comorbidity and body mass index (BMI) of 36.0 to 36.9 in adult, unspecified obesity type        Relevant Medications    tirzepatide (Mounjaro) 15 mg/0.5 mL pen injector    Other Relevant Orders    Referral to Clinical Pharmacy      Essential hypertension              Pharmacy Follow-Up: 09/10/2025   PCP Follow-Up: 12/04/2025    Continue all meds under the continuation of care with the referring provider and clinical pharmacy team.    Thank you,    Freedom Smith,  PharmD   Clinical Pharmacist    Verbal consent to manage patient's drug therapy was obtained from the patient. They were informed they may decline to participate or withdraw from participation in pharmacy services at any time.

## 2025-08-08 ENCOUNTER — PHARMACY VISIT (OUTPATIENT)
Dept: PHARMACY | Facility: CLINIC | Age: 65
End: 2025-08-08
Payer: COMMERCIAL

## 2025-08-13 ENCOUNTER — TELEPHONE (OUTPATIENT)
Dept: PRIMARY CARE | Facility: CLINIC | Age: 65
End: 2025-08-13
Payer: MEDICARE

## 2025-08-13 DIAGNOSIS — D12.6 TUBULAR ADENOMA OF COLON: Primary | ICD-10-CM

## 2025-09-10 ENCOUNTER — APPOINTMENT (OUTPATIENT)
Dept: PHARMACY | Facility: HOSPITAL | Age: 65
End: 2025-09-10
Payer: MEDICARE

## 2025-12-04 ENCOUNTER — APPOINTMENT (OUTPATIENT)
Dept: PRIMARY CARE | Facility: CLINIC | Age: 65
End: 2025-12-04
Payer: COMMERCIAL

## 2026-06-02 ENCOUNTER — APPOINTMENT (OUTPATIENT)
Dept: PRIMARY CARE | Facility: CLINIC | Age: 66
End: 2026-06-02
Payer: MEDICARE